# Patient Record
Sex: FEMALE | Race: WHITE | NOT HISPANIC OR LATINO | Employment: OTHER | URBAN - METROPOLITAN AREA
[De-identification: names, ages, dates, MRNs, and addresses within clinical notes are randomized per-mention and may not be internally consistent; named-entity substitution may affect disease eponyms.]

---

## 2023-08-17 ENCOUNTER — HOSPITAL ENCOUNTER (OUTPATIENT)
Dept: DATA CONVERSION | Facility: HOSPITAL | Age: 48
Discharge: HOME | End: 2023-08-17
Payer: COMMERCIAL

## 2023-08-17 DIAGNOSIS — M81.0 AGE-RELATED OSTEOPOROSIS WITHOUT CURRENT PATHOLOGICAL FRACTURE: ICD-10-CM

## 2023-08-28 PROBLEM — D64.9 ANEMIA: Status: ACTIVE | Noted: 2023-08-28

## 2023-08-28 PROBLEM — J44.9 COPD (CHRONIC OBSTRUCTIVE PULMONARY DISEASE) (MULTI): Status: ACTIVE | Noted: 2023-08-28

## 2023-08-28 PROBLEM — F17.200 TOBACCO DEPENDENCE SYNDROME: Status: ACTIVE | Noted: 2023-08-28

## 2023-08-28 PROBLEM — N32.81 OVERACTIVE BLADDER: Status: ACTIVE | Noted: 2023-08-28

## 2023-08-28 PROBLEM — F41.0 PANIC ATTACK: Status: ACTIVE | Noted: 2023-08-28

## 2023-08-28 PROBLEM — K63.89 BACTERIAL OVERGROWTH SYNDROME: Status: ACTIVE | Noted: 2023-08-28

## 2023-08-28 PROBLEM — G63 NEUROPATHY DUE TO MEDICAL CONDITION (MULTI): Status: ACTIVE | Noted: 2023-08-28

## 2023-08-28 PROBLEM — R35.0 URINARY FREQUENCY: Status: ACTIVE | Noted: 2023-08-28

## 2023-08-28 PROBLEM — R31.9 HEMATURIA: Status: ACTIVE | Noted: 2023-08-28

## 2023-08-28 PROBLEM — A63.0 CONDYLOMA ACUMINATUM OF VULVA: Status: ACTIVE | Noted: 2023-08-28

## 2023-08-28 PROBLEM — D72.829 LEUKOCYTOSIS: Status: ACTIVE | Noted: 2023-08-28

## 2023-08-28 PROBLEM — M79.609 PAIN IN LIMB: Status: ACTIVE | Noted: 2023-08-28

## 2023-08-28 PROBLEM — Q67.8: Status: ACTIVE | Noted: 2023-08-28

## 2023-08-28 PROBLEM — R30.0 DYSURIA: Status: ACTIVE | Noted: 2023-08-28

## 2023-08-28 PROBLEM — M81.0 OSTEOPOROSIS: Status: ACTIVE | Noted: 2023-08-28

## 2023-08-28 PROBLEM — M41.9 SCOLIOSIS OF THORACIC SPINE: Status: ACTIVE | Noted: 2023-08-28

## 2023-08-28 PROBLEM — F17.200 SMOKER: Status: ACTIVE | Noted: 2023-08-28

## 2023-08-28 PROBLEM — R61 HYPERHIDROSIS: Status: ACTIVE | Noted: 2023-08-28

## 2023-08-28 PROBLEM — E55.9 VITAMIN D DEFICIENCY: Status: ACTIVE | Noted: 2023-08-28

## 2023-08-28 PROBLEM — N95.1 PERIMENOPAUSAL: Status: ACTIVE | Noted: 2023-08-28

## 2023-08-28 PROBLEM — F41.1 GENERALIZED ANXIETY DISORDER: Status: ACTIVE | Noted: 2023-08-28

## 2023-08-28 PROBLEM — I73.89 ACROCYANOSIS (CMS-HCC): Status: ACTIVE | Noted: 2023-08-28

## 2023-08-28 PROBLEM — R07.9 CHEST PAIN: Status: ACTIVE | Noted: 2023-08-28

## 2023-08-28 PROBLEM — L74.519 PRIMARY FOCAL HYPERHIDROSIS: Status: ACTIVE | Noted: 2023-08-28

## 2023-08-28 PROBLEM — R10.84 ABDOMINAL PAIN, GENERALIZED: Status: ACTIVE | Noted: 2023-08-28

## 2023-08-28 PROBLEM — I10 BENIGN HYPERTENSION: Status: ACTIVE | Noted: 2023-08-28

## 2023-08-28 PROBLEM — N63.0 MASS OF BREAST: Status: ACTIVE | Noted: 2023-08-28

## 2023-08-28 PROBLEM — B00.9 HERPES SIMPLEX: Status: ACTIVE | Noted: 2023-08-28

## 2023-08-28 PROBLEM — J01.90 ACUTE NON-RECURRENT SINUSITIS: Status: ACTIVE | Noted: 2023-08-28

## 2023-08-28 PROBLEM — R53.82 CHRONIC FATIGUE: Status: ACTIVE | Noted: 2023-08-28

## 2023-08-28 PROBLEM — M79.7 FIBROMYALGIA: Status: ACTIVE | Noted: 2023-08-28

## 2023-08-28 PROBLEM — Q50.39 OVARIAN ANOMALY: Status: ACTIVE | Noted: 2023-08-28

## 2023-08-28 PROBLEM — K21.9 GASTROESOPHAGEAL REFLUX DISEASE: Status: ACTIVE | Noted: 2023-08-28

## 2023-08-28 RX ORDER — AMLODIPINE BESYLATE 5 MG/1
5 TABLET ORAL DAILY
COMMUNITY
Start: 2023-01-19 | End: 2024-05-09

## 2023-08-28 RX ORDER — ALPRAZOLAM 0.5 MG/1
0.5 TABLET ORAL NIGHTLY PRN
COMMUNITY

## 2023-08-28 RX ORDER — DULOXETIN HYDROCHLORIDE 60 MG/1
60 CAPSULE, DELAYED RELEASE ORAL 2 TIMES DAILY
COMMUNITY

## 2023-08-28 RX ORDER — PANTOPRAZOLE SODIUM 40 MG/1
40 TABLET, DELAYED RELEASE ORAL
COMMUNITY
Start: 2023-01-19

## 2023-08-28 RX ORDER — TRAZODONE HYDROCHLORIDE 100 MG/1
100 TABLET ORAL NIGHTLY
COMMUNITY

## 2023-08-28 RX ORDER — VALACYCLOVIR HYDROCHLORIDE 500 MG/1
500 TABLET, FILM COATED ORAL DAILY
COMMUNITY
Start: 2023-01-19

## 2023-08-31 RX ORDER — NITROFURANTOIN 25; 75 MG/1; MG/1
100 CAPSULE ORAL EVERY 12 HOURS
COMMUNITY
Start: 2023-04-21 | End: 2023-12-07

## 2023-08-31 RX ORDER — MULTIVITAMIN
TABLET ORAL
COMMUNITY
Start: 2023-08-24

## 2023-08-31 RX ORDER — DULOXETIN HYDROCHLORIDE 30 MG/1
30 CAPSULE, DELAYED RELEASE ORAL EVERY MORNING
COMMUNITY
Start: 2023-03-30 | End: 2023-12-07

## 2023-11-22 ENCOUNTER — APPOINTMENT (OUTPATIENT)
Dept: PRIMARY CARE | Facility: CLINIC | Age: 48
End: 2023-11-22
Payer: COMMERCIAL

## 2023-12-07 ENCOUNTER — OFFICE VISIT (OUTPATIENT)
Dept: PRIMARY CARE | Facility: CLINIC | Age: 48
End: 2023-12-07
Payer: COMMERCIAL

## 2023-12-07 VITALS
OXYGEN SATURATION: 97 % | DIASTOLIC BLOOD PRESSURE: 83 MMHG | HEART RATE: 95 BPM | SYSTOLIC BLOOD PRESSURE: 119 MMHG | BODY MASS INDEX: 19.9 KG/M2 | RESPIRATION RATE: 16 BRPM | HEIGHT: 61 IN | WEIGHT: 105.4 LBS

## 2023-12-07 DIAGNOSIS — M81.0 AGE-RELATED OSTEOPOROSIS WITHOUT CURRENT PATHOLOGICAL FRACTURE: ICD-10-CM

## 2023-12-07 DIAGNOSIS — E55.9 VITAMIN D DEFICIENCY: ICD-10-CM

## 2023-12-07 DIAGNOSIS — M25.551 BILATERAL HIP PAIN: ICD-10-CM

## 2023-12-07 DIAGNOSIS — G56.01 CARPAL TUNNEL SYNDROME OF RIGHT WRIST: ICD-10-CM

## 2023-12-07 DIAGNOSIS — M79.7 FIBROMYALGIA: Primary | ICD-10-CM

## 2023-12-07 DIAGNOSIS — I10 BENIGN HYPERTENSION: ICD-10-CM

## 2023-12-07 DIAGNOSIS — M25.552 BILATERAL HIP PAIN: ICD-10-CM

## 2023-12-07 DIAGNOSIS — Z12.31 SCREENING MAMMOGRAM FOR BREAST CANCER: ICD-10-CM

## 2023-12-07 DIAGNOSIS — F41.1 GENERALIZED ANXIETY DISORDER: ICD-10-CM

## 2023-12-07 PROCEDURE — 4004F PT TOBACCO SCREEN RCVD TLK: CPT

## 2023-12-07 PROCEDURE — 99214 OFFICE O/P EST MOD 30 MIN: CPT

## 2023-12-07 PROCEDURE — 3079F DIAST BP 80-89 MM HG: CPT

## 2023-12-07 PROCEDURE — 3074F SYST BP LT 130 MM HG: CPT

## 2023-12-07 RX ORDER — FOLIC ACID/MULTIVIT,IRON,MINER 0.4MG-18MG
800 TABLET ORAL
Qty: 180 TABLET | Refills: 3 | Status: SHIPPED | OUTPATIENT
Start: 2023-12-07 | End: 2024-12-06

## 2023-12-07 ASSESSMENT — PATIENT HEALTH QUESTIONNAIRE - PHQ9
SUM OF ALL RESPONSES TO PHQ9 QUESTIONS 1 AND 2: 4
SUM OF ALL RESPONSES TO PHQ9 QUESTIONS 1 AND 2: 0
1. LITTLE INTEREST OR PLEASURE IN DOING THINGS: NOT AT ALL
4. FEELING TIRED OR HAVING LITTLE ENERGY: MORE THAN HALF THE DAYS
2. FEELING DOWN, DEPRESSED OR HOPELESS: MORE THAN HALF THE DAYS
9. THOUGHTS THAT YOU WOULD BE BETTER OFF DEAD, OR OF HURTING YOURSELF: NOT AT ALL
SUM OF ALL RESPONSES TO PHQ QUESTIONS 1-9: 14
3. TROUBLE FALLING OR STAYING ASLEEP OR SLEEPING TOO MUCH: MORE THAN HALF THE DAYS
2. FEELING DOWN, DEPRESSED OR HOPELESS: NOT AT ALL
7. TROUBLE CONCENTRATING ON THINGS, SUCH AS READING THE NEWSPAPER OR WATCHING TELEVISION: MORE THAN HALF THE DAYS
1. LITTLE INTEREST OR PLEASURE IN DOING THINGS: MORE THAN HALF THE DAYS
8. MOVING OR SPEAKING SO SLOWLY THAT OTHER PEOPLE COULD HAVE NOTICED. OR THE OPPOSITE, BEING SO FIGETY OR RESTLESS THAT YOU HAVE BEEN MOVING AROUND A LOT MORE THAN USUAL: NOT AT ALL
5. POOR APPETITE OR OVEREATING: MORE THAN HALF THE DAYS
6. FEELING BAD ABOUT YOURSELF - OR THAT YOU ARE A FAILURE OR HAVE LET YOURSELF OR YOUR FAMILY DOWN: MORE THAN HALF THE DAYS

## 2023-12-07 ASSESSMENT — ENCOUNTER SYMPTOMS
LOSS OF SENSATION IN FEET: 0
DEPRESSION: 0
OCCASIONAL FEELINGS OF UNSTEADINESS: 0

## 2023-12-07 ASSESSMENT — COLUMBIA-SUICIDE SEVERITY RATING SCALE - C-SSRS
2. HAVE YOU ACTUALLY HAD ANY THOUGHTS OF KILLING YOURSELF?: NO
1. IN THE PAST MONTH, HAVE YOU WISHED YOU WERE DEAD OR WISHED YOU COULD GO TO SLEEP AND NOT WAKE UP?: NO
6. HAVE YOU EVER DONE ANYTHING, STARTED TO DO ANYTHING, OR PREPARED TO DO ANYTHING TO END YOUR LIFE?: NO

## 2023-12-07 ASSESSMENT — PAIN SCALES - GENERAL: PAINLEVEL: 8

## 2023-12-07 NOTE — PATIENT INSTRUCTIONS
Decrease intake of saturated fats, fast food, sweets.  Increase intake of fresh fruit fresh vegetables and lean meats.  Increase healthy fats seeds, nuts, olive oil instead of butter.  walk 150 minutes/week for heart health.  Decrease intake of processed foods, fast foods, lunch meat, canned soups, canned veggies.  Increase intake of fresh fruits, veggies, and lean meats. Monitor blood pressure at home, keep a log and bring this with you to your next appointment. Call the office if your blood pressure runs 150/90 or higher consistently.          Ways to Help Prevent Falls at Home    Quick Tips   ? Ask for help if you need it. Most people want to help!   ? Get up slowly after sitting or laying down   ? Wear a medical alert device or keep cell phone in your pocket   ? Use night lights, especially areas near a bathroom   ? Keep the items you use often within reach on a small stool or end table   ? Use an assistive device such as walker or cane, as directed by provider/physical therapy   ? Use a non-slip mat and grab bars in your bathroom. Look for home health sections for best options     Other Areas to Focus On   ? Exercise and nutrition: Regular exercise or taking a falls prevention class are great ways improve strength and balance. Don’t forget to stay hydrated and bring a snack!   ? Medicine side effects: Some medicines can make you sleepy or dizzy, which could cause a fall. Ask your healthcare provider about the side effects your medicines could cause. Be sure to let them know if you take any vitamins or supplements as well.   ? Tripping hazards: Remove items you could trip on, such as loose mats, rugs, cords, and clutter. Wear closed toe shoes with rubber soles.   ? Health and wellness: Get regular checkups with your healthcare provider, plus routine vision and hearing screenings. Talk with your healthcare provider about:   o Your medicines and the possible side effects - bring them in a bag if that is easier!    o Problems with balance or feeling dizzy   o Ways to promote bone health, such as Vitamin D and calcium supplements   o Questions or concerns about falling     *Ask your healthcare team if you have questions     [facility], 2022         Ways to Help Prevent Falls at Home    Quick Tips   ? Ask for help if you need it. Most people want to help!   ? Get up slowly after sitting or laying down   ? Wear a medical alert device or keep cell phone in your pocket   ? Use night lights, especially areas near a bathroom   ? Keep the items you use often within reach on a small stool or end table   ? Use an assistive device such as walker or cane, as directed by provider/physical therapy   ? Use a non-slip mat and grab bars in your bathroom. Look for home health sections for best options     Other Areas to Focus On   ? Exercise and nutrition: Regular exercise or taking a falls prevention class are great ways improve strength and balance. Don’t forget to stay hydrated and bring a snack!   ? Medicine side effects: Some medicines can make you sleepy or dizzy, which could cause a fall. Ask your healthcare provider about the side effects your medicines could cause. Be sure to let them know if you take any vitamins or supplements as well.   ? Tripping hazards: Remove items you could trip on, such as loose mats, rugs, cords, and clutter. Wear closed toe shoes with rubber soles.   ? Health and wellness: Get regular checkups with your healthcare provider, plus routine vision and hearing screenings. Talk with your healthcare provider about:   o Your medicines and the possible side effects - bring them in a bag if that is easier!   o Problems with balance or feeling dizzy   o Ways to promote bone health, such as Vitamin D and calcium supplements   o Questions or concerns about falling     *Ask your healthcare team if you have questions     ©[facility], 2022

## 2023-12-07 NOTE — PROGRESS NOTES
"Subjective   Patient ID: Julee Hinojosa is a 48 y.o. female who presents for Follow-up.    HPI   Follow-up.  Since I saw her last she has been having right numbing painful sensation in her right wrists.  She denies any issues with chest pain, chest pressure, shortness of breath constipation, diarrhea, blood in stool, urinary urgency, frequency, blood in urine muscle weakness in arms and legs numbness and tingling in her feet.  She does have a history of fibromyalgia that she does not see rheumatology for.  She does not monitor her blood pressures at home.  She does note that she has fallen with no injury.  She has bilateral hip pain only with sleep sharp shooting pains will get x-ray today    Review of Systems  Review of Systems negative except as noted in HPI and Chief complaint.    Current Outpatient Medications:     ALPRAZolam (Xanax) 0.5 mg tablet, Take 1 tablet (0.5 mg) by mouth as needed at bedtime., Disp: , Rfl:     amLODIPine (Norvasc) 5 mg tablet, Take 1 tablet (5 mg) by mouth once daily., Disp: , Rfl:     calcium carbonate-vitamin D3 600 mg-10 mcg (400 unit) tablet, , Disp: , Rfl:     DULoxetine (Cymbalta) 60 mg DR capsule, Take 1 capsule (60 mg) by mouth 2 times a day., Disp: , Rfl:     pantoprazole (ProtoNix) 40 mg EC tablet, Take 1 tablet (40 mg) by mouth once daily in the morning. Take before meals., Disp: , Rfl:     traZODone (Desyrel) 100 mg tablet, Take 1 tablet (100 mg) by mouth once daily at bedtime., Disp: , Rfl:     valACYclovir (Valtrex) 500 mg tablet, Take 1 tablet (500 mg) by mouth once daily., Disp: , Rfl: '    Objective   /83 (BP Location: Left arm, Patient Position: Sitting, BP Cuff Size: Adult)   Pulse 95   Resp 16   Ht 1.549 m (5' 1\")   Wt 47.8 kg (105 lb 6.4 oz)   SpO2 97%   BMI 19.92 kg/m²     Physical Exam  Vitals and nursing note reviewed.   Constitutional:       Appearance: Normal appearance.   Cardiovascular:      Rate and Rhythm: Normal rate and regular rhythm. "   Musculoskeletal:         General: Normal range of motion.      Cervical back: Normal range of motion.   Neurological:      General: No focal deficit present.      Mental Status: She is alert and oriented to person, place, and time. Mental status is at baseline.   Psychiatric:         Mood and Affect: Mood normal.         Behavior: Behavior normal.         Thought Content: Thought content normal.         Judgment: Judgment normal.     Assessment/Plan   Diagnoses and all orders for this visit:  Fibromyalgia  Generalized anxiety disorder  Benign hypertension  Vitamin D deficiency  Age-related osteoporosis without current pathological fracture  Other orders  -     Follow Up In Primary Care - Health Maintenance; Future    Decrease intake of saturated fats, fast food, sweets.  Increase intake of fresh fruit fresh vegetables and lean meats.  Increase healthy fats seeds, nuts, olive oil instead of butter.  walk 150 minutes/week for heart health.  Decrease intake of processed foods, fast foods, lunch meat, canned soups, canned veggies.  Increase intake of fresh fruits, veggies, and lean meats. Monitor blood pressure at home, keep a log and bring this with you to your next appointment. Call the office if your blood pressure runs 150/90 or higher consistently.  This note was dictated using DRAGON speech recognition software and was corrected for spelling or grammatical errors, but despite proofreading several typographical errors might be present that might affect the meaning of the content.  Perri Silver CNP    Patient was identified as a fall risk. Risk prevention instructions provided.Patient was identified as a fall risk. Risk prevention instructions provided.

## 2024-03-26 ENCOUNTER — APPOINTMENT (OUTPATIENT)
Dept: PRIMARY CARE | Facility: CLINIC | Age: 49
End: 2024-03-26
Payer: COMMERCIAL

## 2024-03-27 ENCOUNTER — APPOINTMENT (OUTPATIENT)
Dept: PRIMARY CARE | Facility: CLINIC | Age: 49
End: 2024-03-27
Payer: COMMERCIAL

## 2024-04-18 ENCOUNTER — OFFICE VISIT (OUTPATIENT)
Dept: PRIMARY CARE | Facility: CLINIC | Age: 49
End: 2024-04-18
Payer: COMMERCIAL

## 2024-04-18 VITALS
DIASTOLIC BLOOD PRESSURE: 84 MMHG | RESPIRATION RATE: 16 BRPM | WEIGHT: 104.8 LBS | BODY MASS INDEX: 19.79 KG/M2 | HEIGHT: 61 IN | HEART RATE: 83 BPM | SYSTOLIC BLOOD PRESSURE: 123 MMHG | OXYGEN SATURATION: 99 %

## 2024-04-18 DIAGNOSIS — Z13.6 ENCOUNTER FOR LIPID SCREENING FOR CARDIOVASCULAR DISEASE: ICD-10-CM

## 2024-04-18 DIAGNOSIS — Z13.220 ENCOUNTER FOR LIPID SCREENING FOR CARDIOVASCULAR DISEASE: ICD-10-CM

## 2024-04-18 DIAGNOSIS — R53.82 CHRONIC FATIGUE: ICD-10-CM

## 2024-04-18 DIAGNOSIS — R61 HYPERHIDROSIS: ICD-10-CM

## 2024-04-18 DIAGNOSIS — I10 BENIGN HYPERTENSION: Primary | ICD-10-CM

## 2024-04-18 DIAGNOSIS — Z72.0 TOBACCO USE: ICD-10-CM

## 2024-04-18 DIAGNOSIS — F41.0 PANIC ATTACK: ICD-10-CM

## 2024-04-18 DIAGNOSIS — K51.80 OTHER ULCERATIVE COLITIS WITHOUT COMPLICATION (MULTI): ICD-10-CM

## 2024-04-18 DIAGNOSIS — R19.7 DIARRHEA OF PRESUMED INFECTIOUS ORIGIN: ICD-10-CM

## 2024-04-18 DIAGNOSIS — D22.9 CHANGE IN MOLE: ICD-10-CM

## 2024-04-18 DIAGNOSIS — E55.9 VITAMIN D DEFICIENCY: ICD-10-CM

## 2024-04-18 DIAGNOSIS — Z13.29 SCREENING FOR THYROID DISORDER: ICD-10-CM

## 2024-04-18 DIAGNOSIS — M81.0 AGE-RELATED OSTEOPOROSIS WITHOUT CURRENT PATHOLOGICAL FRACTURE: ICD-10-CM

## 2024-04-18 DIAGNOSIS — J41.0 SIMPLE CHRONIC BRONCHITIS (MULTI): ICD-10-CM

## 2024-04-18 DIAGNOSIS — G63 NEUROPATHY DUE TO MEDICAL CONDITION (MULTI): ICD-10-CM

## 2024-04-18 DIAGNOSIS — R73.01 ELEVATED FASTING GLUCOSE: ICD-10-CM

## 2024-04-18 DIAGNOSIS — K21.9 GASTROESOPHAGEAL REFLUX DISEASE WITHOUT ESOPHAGITIS: ICD-10-CM

## 2024-04-18 DIAGNOSIS — F17.200 TOBACCO DEPENDENCE SYNDROME: ICD-10-CM

## 2024-04-18 DIAGNOSIS — M41.9 SCOLIOSIS OF THORACIC SPINE, UNSPECIFIED SCOLIOSIS TYPE: ICD-10-CM

## 2024-04-18 DIAGNOSIS — Z91.89 AT RISK FOR DEPRESSION: ICD-10-CM

## 2024-04-18 DIAGNOSIS — Z00.00 ENCOUNTER FOR ANNUAL PHYSICAL EXAM: ICD-10-CM

## 2024-04-18 DIAGNOSIS — I73.89 ACROCYANOSIS (CMS-HCC): ICD-10-CM

## 2024-04-18 DIAGNOSIS — F41.1 GENERALIZED ANXIETY DISORDER: ICD-10-CM

## 2024-04-18 PROBLEM — K51.90 ULCERATIVE COLITIS (MULTI): Status: ACTIVE | Noted: 2024-04-18

## 2024-04-18 PROCEDURE — 99396 PREV VISIT EST AGE 40-64: CPT

## 2024-04-18 PROCEDURE — 93010 ELECTROCARDIOGRAM REPORT: CPT

## 2024-04-18 PROCEDURE — 93005 ELECTROCARDIOGRAM TRACING: CPT

## 2024-04-18 PROCEDURE — 3079F DIAST BP 80-89 MM HG: CPT

## 2024-04-18 PROCEDURE — 4004F PT TOBACCO SCREEN RCVD TLK: CPT

## 2024-04-18 PROCEDURE — 3074F SYST BP LT 130 MM HG: CPT

## 2024-04-18 RX ORDER — ONDANSETRON 4 MG/1
4 TABLET, FILM COATED ORAL EVERY 8 HOURS PRN
COMMUNITY

## 2024-04-18 ASSESSMENT — PATIENT HEALTH QUESTIONNAIRE - PHQ9
4. FEELING TIRED OR HAVING LITTLE ENERGY: MORE THAN HALF THE DAYS
1. LITTLE INTEREST OR PLEASURE IN DOING THINGS: MORE THAN HALF THE DAYS
SUM OF ALL RESPONSES TO PHQ9 QUESTIONS 1 AND 2: 4
6. FEELING BAD ABOUT YOURSELF - OR THAT YOU ARE A FAILURE OR HAVE LET YOURSELF OR YOUR FAMILY DOWN: MORE THAN HALF THE DAYS
1. LITTLE INTEREST OR PLEASURE IN DOING THINGS: NOT AT ALL
3. TROUBLE FALLING OR STAYING ASLEEP OR SLEEPING TOO MUCH: MORE THAN HALF THE DAYS
2. FEELING DOWN, DEPRESSED OR HOPELESS: MORE THAN HALF THE DAYS
7. TROUBLE CONCENTRATING ON THINGS, SUCH AS READING THE NEWSPAPER OR WATCHING TELEVISION: MORE THAN HALF THE DAYS
8. MOVING OR SPEAKING SO SLOWLY THAT OTHER PEOPLE COULD HAVE NOTICED. OR THE OPPOSITE, BEING SO FIGETY OR RESTLESS THAT YOU HAVE BEEN MOVING AROUND A LOT MORE THAN USUAL: MORE THAN HALF THE DAYS
SUM OF ALL RESPONSES TO PHQ QUESTIONS 1-9: 16
2. FEELING DOWN, DEPRESSED OR HOPELESS: NOT AT ALL
10. IF YOU CHECKED OFF ANY PROBLEMS, HOW DIFFICULT HAVE THESE PROBLEMS MADE IT FOR YOU TO DO YOUR WORK, TAKE CARE OF THINGS AT HOME, OR GET ALONG WITH OTHER PEOPLE: SOMEWHAT DIFFICULT
5. POOR APPETITE OR OVEREATING: MORE THAN HALF THE DAYS
SUM OF ALL RESPONSES TO PHQ9 QUESTIONS 1 AND 2: 0
9. THOUGHTS THAT YOU WOULD BE BETTER OFF DEAD, OR OF HURTING YOURSELF: NOT AT ALL

## 2024-04-18 ASSESSMENT — ENCOUNTER SYMPTOMS
LOSS OF SENSATION IN FEET: 0
DEPRESSION: 0
OCCASIONAL FEELINGS OF UNSTEADINESS: 0

## 2024-04-18 ASSESSMENT — COLUMBIA-SUICIDE SEVERITY RATING SCALE - C-SSRS
1. IN THE PAST MONTH, HAVE YOU WISHED YOU WERE DEAD OR WISHED YOU COULD GO TO SLEEP AND NOT WAKE UP?: NO
2. HAVE YOU ACTUALLY HAD ANY THOUGHTS OF KILLING YOURSELF?: NO
6. HAVE YOU EVER DONE ANYTHING, STARTED TO DO ANYTHING, OR PREPARED TO DO ANYTHING TO END YOUR LIFE?: NO

## 2024-04-18 ASSESSMENT — PAIN SCALES - GENERAL: PAINLEVEL: 8

## 2024-04-18 NOTE — PROGRESS NOTES
Subjective   Patient ID: Julee iHnojosa is a 49 y.o. female who presents for Annual Exam.    HPI   Patient denies any falls, urgent care, ER, hospitalization, new diagnoses, surgeries in the past year.  Denies any issues with chest pain, chest pressure, shortness of breath, constipation, diarrhea, blood in stool, urinary urgency, frequency, blood in urine, muscle weakness in arms and legs, numbness or tingling in fingers or toes.  Patient has multiple moles on her back that she would like to have looked at.  She is 13 cavities that need to be filled.  She went to the dentist they gave her 4 shots of Novocain after that they returned to the office to tell her they could not pull her teeth because she has a history of osteoporosis.  She would like a referral back to rheumatology.  She has ongoing issues with hyperhidrosis under arms, hands, feet she has tried multiple different medications in the past unclear at this time what those medications were she would like referral back to dermatology to discuss ongoing treatment.  Since she was given a shot of Novocain from her dentist she was on 3 different antibiotics since then she has had diarrhea she has been feeling dizzy her ears have been ringing and she feels warm and tingly all over when she stands up.  She does have a history of colitis per the patient has not followed up with GI    Review of Systems  Review of Systems negative except as noted in HPI and Chief complaint.    Current Outpatient Medications:     ALPRAZolam (Xanax) 0.5 mg tablet, Take 1 tablet (0.5 mg) by mouth as needed at bedtime., Disp: , Rfl:     amLODIPine (Norvasc) 5 mg tablet, Take 1 tablet (5 mg) by mouth once daily., Disp: , Rfl:     calcium carbonate-vitamin D3 600 mg-10 mcg (400 unit) tablet, , Disp: , Rfl:     cholecalciferol (Vitamin D-3) 10 mcg (400 unit) tablet,chewable, Chew 2 tablets (800 Units) once daily., Disp: 180 tablet, Rfl: 3    DULoxetine (Cymbalta) 60 mg DR capsule, Take 1  "capsule (60 mg) by mouth 2 times a day., Disp: , Rfl:     ondansetron (Zofran) 4 mg tablet, Take 1 tablet (4 mg) by mouth every 8 hours if needed for nausea or vomiting., Disp: , Rfl:     pantoprazole (ProtoNix) 40 mg EC tablet, Take 1 tablet (40 mg) by mouth once daily in the morning. Take before meals., Disp: , Rfl:     traZODone (Desyrel) 100 mg tablet, Take 1 tablet (100 mg) by mouth once daily at bedtime., Disp: , Rfl:     valACYclovir (Valtrex) 500 mg tablet, Take 1 tablet (500 mg) by mouth once daily., Disp: , Rfl:     Objective   /84 (BP Location: Left arm, Patient Position: Sitting, BP Cuff Size: Adult)   Pulse 83   Resp 16   Ht 1.549 m (5' 1\")   Wt 47.5 kg (104 lb 12.8 oz)   SpO2 99%   BMI 19.80 kg/m²     Physical Exam  Vitals reviewed.   Constitutional:       General: She is not in acute distress.     Appearance: Normal appearance.   HENT:      Head: Normocephalic.      Right Ear: Tympanic membrane normal.      Left Ear: Tympanic membrane normal.      Nose: Nose normal.      Mouth/Throat:      Mouth: Mucous membranes are moist.      Pharynx: Oropharynx is clear.   Eyes:      Extraocular Movements: Extraocular movements intact.      Conjunctiva/sclera: Conjunctivae normal.      Pupils: Pupils are equal, round, and reactive to light.   Cardiovascular:      Rate and Rhythm: Normal rate.      Pulses: Normal pulses.      Heart sounds: Normal heart sounds.   Pulmonary:      Effort: Pulmonary effort is normal.      Breath sounds: Normal breath sounds.   Abdominal:      General: Abdomen is flat. Bowel sounds are normal.      Palpations: Abdomen is soft.   Musculoskeletal:         General: Normal range of motion.   Skin:     General: Skin is warm and dry.      Capillary Refill: Capillary refill takes 2 to 3 seconds.   Neurological:      General: No focal deficit present.      Mental Status: She is alert and oriented to person, place, and time. Mental status is at baseline.   Psychiatric:         Mood " and Affect: Mood normal.         Behavior: Behavior is cooperative.         Assessment/Plan   Diagnoses and all orders for this visit:  Benign hypertension  -     ECG 12 lead (Clinic Performed)  -     Comprehensive Metabolic Panel; Future  -     CBC and Auto Differential; Future  Neuropathy due to medical condition (Multi)  Simple chronic bronchitis (Multi)  Acrocyanosis (CMS-HCC)  Vitamin D deficiency  -     Vitamin D 25-Hydroxy,Total (for eval of Vitamin D levels); Future  Age-related osteoporosis without current pathological fracture  -     Referral to Rheumatology; Future  Gastroesophageal reflux disease without esophagitis  Panic attack  Generalized anxiety disorder  Scoliosis of thoracic spine, unspecified scoliosis type  Tobacco dependence syndrome  Chronic fatigue  Hyperhidrosis  -     Referral to Dermatology  Tobacco use  Encounter for annual physical exam  Encounter for lipid screening for cardiovascular disease  -     Lipid Panel; Future  Screening for thyroid disorder  -     TSH with reflex to Free T4 if abnormal; Future  Elevated fasting glucose  -     Hemoglobin A1C; Future  At risk for depression  Change in mole  -     Referral to Dermatology  Diarrhea of presumed infectious origin  -     Giardia/Cryptosporidium Antigens; Future  -     Occult blood x 1, stool; Future  -     CT abdomen w and wo IV contrast; Future  -     Ambulatory referral to Gastroenterology; Future    ANXIETY:   Stress/Anxiety reduction interventions:    -Relaxation techniques- deep breathing, meditation, acupuncture, guided imagery, yoga  -3-3-3 sit down in a quiet room. Look around the room and name 3 items you see. Then close your eyes and listen and quietly name 3 items you hear around you. Lastly, move 3 different body parts in a [place] 10 times each  -Avoid caffeine, alcohol, illicit drug use  -Get enough sleep, 7-9 hours per night  -Eat a healthy diet, prioritizing lean proteins, fruits/vegetables, and whole  grains  -Purposeful movement daily- walking, biking, strength training  -Speaking with a counselor can be very helpful.  LAB Order/ BLOOD TESTS   I have ordered lab work for you to get done. This should be fasting. Nothing to eat or drink after midnight besides black tea,  black coffee, or water. If you do not hear from this office within two days of having your labs done, please call for your results.     HYPERLIPIDEMIA:  Decrease intake of saturated fats, fast food, sweets.  Increase intake of fresh fruit fresh vegetables and lean meats.  Increase healthy fats seeds, nuts, olive oil instead of butter.  walk 150 minutes/week for heart health.    Colitis, diarrhea-    I have placed a referral to GI for further management.  Moles/Hyperhidrosis-  I have placed a referral to derm for further management.    *This note was dictated using DRAGON speech recognition software and was corrected for spelling or grammatical errors, but despite proofreading several typographical errors might be present that might affect the meaning of the content.*  Perri Silver CNP    Time Spent  Prep time on day of patient encounter: 5 minutes  Time spent directly with patient, family or caregiver: 20 minutes  Documentation Time: 5 minutes    Patient was identified as a fall risk. Risk prevention instructions provided.

## 2024-04-18 NOTE — PATIENT INSTRUCTIONS
ANXIETY:   Stress/Anxiety reduction interventions:    -Relaxation techniques- deep breathing, meditation, acupuncture, guided imagery, yoga  -3-3-3 sit down in a quiet room. Look around the room and name 3 items you see. Then close your eyes and listen and quietly name 3 items you hear around you. Lastly, move 3 different body parts in a [place] 10 times each  -Avoid caffeine, alcohol, illicit drug use  -Get enough sleep, 7-9 hours per night  -Eat a healthy diet, prioritizing lean proteins, fruits/vegetables, and whole grains  -Purposeful movement daily- walking, biking, strength training  -Speaking with a counselor can be very helpful.  LAB Order/ BLOOD TESTS   I have ordered lab work for you to get done. This should be fasting. Nothing to eat or drink after midnight besides black tea,  black coffee, or water. If you do not hear from this office within two days of having your labs done, please call for your results.     HYPERLIPIDEMIA:  Decrease intake of saturated fats, fast food, sweets.  Increase intake of fresh fruit fresh vegetables and lean meats.  Increase healthy fats seeds, nuts, olive oil instead of butter.  walk 150 minutes/week for heart health.    Colitis, diarrhea-    I have placed a referral to GI for further management.  Moles/Hyperhidrosis-  I have placed a referral to derm for further management.  Ways to Help Prevent Falls at Home    Quick Tips   ? Ask for help if you need it. Most people want to help!   ? Get up slowly after sitting or laying down   ? Wear a medical alert device or keep cell phone in your pocket   ? Use night lights, especially areas near a bathroom   ? Keep the items you use often within reach on a small stool or end table   ? Use an assistive device such as walker or cane, as directed by provider/physical therapy   ? Use a non-slip mat and grab bars in your bathroom. Look for home health sections for best options     Other Areas to Focus On   ? Exercise and nutrition: Regular  exercise or taking a falls prevention class are great ways improve strength and balance. Don’t forget to stay hydrated and bring a snack!   ? Medicine side effects: Some medicines can make you sleepy or dizzy, which could cause a fall. Ask your healthcare provider about the side effects your medicines could cause. Be sure to let them know if you take any vitamins or supplements as well.   ? Tripping hazards: Remove items you could trip on, such as loose mats, rugs, cords, and clutter. Wear closed toe shoes with rubber soles.   ? Health and wellness: Get regular checkups with your healthcare provider, plus routine vision and hearing screenings. Talk with your healthcare provider about:   o Your medicines and the possible side effects - bring them in a bag if that is easier!   o Problems with balance or feeling dizzy   o Ways to promote bone health, such as Vitamin D and calcium supplements   o Questions or concerns about falling     *Ask your healthcare team if you have questions     ©[facility], 2022    pt OOB to chair already

## 2024-04-19 ENCOUNTER — LAB (OUTPATIENT)
Dept: LAB | Facility: LAB | Age: 49
End: 2024-04-19
Payer: COMMERCIAL

## 2024-04-19 DIAGNOSIS — I10 BENIGN HYPERTENSION: ICD-10-CM

## 2024-04-19 DIAGNOSIS — R19.7 DIARRHEA OF PRESUMED INFECTIOUS ORIGIN: ICD-10-CM

## 2024-04-19 DIAGNOSIS — Z13.6 ENCOUNTER FOR LIPID SCREENING FOR CARDIOVASCULAR DISEASE: ICD-10-CM

## 2024-04-19 DIAGNOSIS — E55.9 VITAMIN D DEFICIENCY: ICD-10-CM

## 2024-04-19 DIAGNOSIS — Z13.220 ENCOUNTER FOR LIPID SCREENING FOR CARDIOVASCULAR DISEASE: ICD-10-CM

## 2024-04-19 DIAGNOSIS — R73.01 ELEVATED FASTING GLUCOSE: ICD-10-CM

## 2024-04-19 DIAGNOSIS — Z13.29 SCREENING FOR THYROID DISORDER: ICD-10-CM

## 2024-04-19 LAB
25(OH)D3 SERPL-MCNC: 61 NG/ML (ref 31–100)
ALBUMIN SERPL-MCNC: 4.2 G/DL (ref 3.5–5)
ALP BLD-CCNC: 87 U/L (ref 35–125)
ALT SERPL-CCNC: 11 U/L (ref 5–40)
ANION GAP SERPL CALC-SCNC: 11 MMOL/L
AST SERPL-CCNC: 18 U/L (ref 5–40)
BASOPHILS # BLD AUTO: 0.03 X10*3/UL (ref 0–0.1)
BASOPHILS NFR BLD AUTO: 0.4 %
BILIRUB SERPL-MCNC: 0.3 MG/DL (ref 0.1–1.2)
BUN SERPL-MCNC: 12 MG/DL (ref 8–25)
CALCIUM SERPL-MCNC: 9.6 MG/DL (ref 8.5–10.4)
CHLORIDE SERPL-SCNC: 102 MMOL/L (ref 97–107)
CHOLEST SERPL-MCNC: 189 MG/DL (ref 133–200)
CHOLEST/HDLC SERPL: 2.2 {RATIO}
CO2 SERPL-SCNC: 26 MMOL/L (ref 24–31)
CREAT SERPL-MCNC: 0.6 MG/DL (ref 0.4–1.6)
EGFRCR SERPLBLD CKD-EPI 2021: >90 ML/MIN/1.73M*2
EOSINOPHIL # BLD AUTO: 0.2 X10*3/UL (ref 0–0.7)
EOSINOPHIL NFR BLD AUTO: 2.6 %
ERYTHROCYTE [DISTWIDTH] IN BLOOD BY AUTOMATED COUNT: 13.2 % (ref 11.5–14.5)
EST. AVERAGE GLUCOSE BLD GHB EST-MCNC: 97 MG/DL
GLUCOSE SERPL-MCNC: 114 MG/DL (ref 65–99)
HBA1C MFR BLD: 5 %
HCT VFR BLD AUTO: 42.7 % (ref 36–46)
HDLC SERPL-MCNC: 85 MG/DL
HEMOCCULT SP1 STL QL: NEGATIVE
HGB BLD-MCNC: 14.2 G/DL (ref 12–16)
IMM GRANULOCYTES # BLD AUTO: 0.03 X10*3/UL (ref 0–0.7)
IMM GRANULOCYTES NFR BLD AUTO: 0.4 % (ref 0–0.9)
LDLC SERPL CALC-MCNC: 75 MG/DL (ref 65–130)
LYMPHOCYTES # BLD AUTO: 1.84 X10*3/UL (ref 1.2–4.8)
LYMPHOCYTES NFR BLD AUTO: 23.5 %
MCH RBC QN AUTO: 33.6 PG (ref 26–34)
MCHC RBC AUTO-ENTMCNC: 33.3 G/DL (ref 32–36)
MCV RBC AUTO: 101 FL (ref 80–100)
MONOCYTES # BLD AUTO: 0.7 X10*3/UL (ref 0.1–1)
MONOCYTES NFR BLD AUTO: 9 %
NEUTROPHILS # BLD AUTO: 5.02 X10*3/UL (ref 1.2–7.7)
NEUTROPHILS NFR BLD AUTO: 64.1 %
NRBC BLD-RTO: 0 /100 WBCS (ref 0–0)
PLATELET # BLD AUTO: 398 X10*3/UL (ref 150–450)
POTASSIUM SERPL-SCNC: 4.8 MMOL/L (ref 3.4–5.1)
PROT SERPL-MCNC: 6.5 G/DL (ref 5.9–7.9)
RBC # BLD AUTO: 4.22 X10*6/UL (ref 4–5.2)
SODIUM SERPL-SCNC: 139 MMOL/L (ref 133–145)
TRIGL SERPL-MCNC: 147 MG/DL (ref 40–150)
TSH SERPL DL<=0.05 MIU/L-ACNC: 0.56 MIU/L (ref 0.27–4.2)
WBC # BLD AUTO: 7.8 X10*3/UL (ref 4.4–11.3)

## 2024-04-19 PROCEDURE — 82270 OCCULT BLOOD FECES: CPT

## 2024-04-19 PROCEDURE — 87328 CRYPTOSPORIDIUM AG IA: CPT

## 2024-04-19 PROCEDURE — 87329 GIARDIA AG IA: CPT

## 2024-04-19 PROCEDURE — 80053 COMPREHEN METABOLIC PANEL: CPT

## 2024-04-19 PROCEDURE — 85025 COMPLETE CBC W/AUTO DIFF WBC: CPT

## 2024-04-19 PROCEDURE — 36415 COLL VENOUS BLD VENIPUNCTURE: CPT

## 2024-04-19 PROCEDURE — 80061 LIPID PANEL: CPT

## 2024-04-19 PROCEDURE — 82306 VITAMIN D 25 HYDROXY: CPT

## 2024-04-19 PROCEDURE — 83036 HEMOGLOBIN GLYCOSYLATED A1C: CPT

## 2024-04-19 PROCEDURE — 84443 ASSAY THYROID STIM HORMONE: CPT

## 2024-04-23 LAB
CRYPTOSP AG STL QL IA: NEGATIVE
G LAMBLIA AG STL QL IA: NEGATIVE

## 2024-05-06 ENCOUNTER — APPOINTMENT (OUTPATIENT)
Dept: RADIOLOGY | Facility: HOSPITAL | Age: 49
End: 2024-05-06
Payer: COMMERCIAL

## 2024-05-09 ENCOUNTER — OFFICE VISIT (OUTPATIENT)
Dept: PRIMARY CARE | Facility: CLINIC | Age: 49
End: 2024-05-09
Payer: COMMERCIAL

## 2024-05-09 VITALS
WEIGHT: 103.4 LBS | HEIGHT: 61 IN | SYSTOLIC BLOOD PRESSURE: 125 MMHG | HEART RATE: 87 BPM | OXYGEN SATURATION: 98 % | DIASTOLIC BLOOD PRESSURE: 76 MMHG | BODY MASS INDEX: 19.52 KG/M2 | RESPIRATION RATE: 16 BRPM

## 2024-05-09 DIAGNOSIS — Z72.0 TOBACCO USE: Primary | ICD-10-CM

## 2024-05-09 DIAGNOSIS — R42 VERTIGO: ICD-10-CM

## 2024-05-09 DIAGNOSIS — I10 BENIGN HYPERTENSION: ICD-10-CM

## 2024-05-09 PROCEDURE — 3078F DIAST BP <80 MM HG: CPT

## 2024-05-09 PROCEDURE — 4004F PT TOBACCO SCREEN RCVD TLK: CPT

## 2024-05-09 PROCEDURE — 99214 OFFICE O/P EST MOD 30 MIN: CPT

## 2024-05-09 PROCEDURE — 3074F SYST BP LT 130 MM HG: CPT

## 2024-05-09 RX ORDER — AMLODIPINE BESYLATE 10 MG/1
10 TABLET ORAL DAILY
Qty: 30 TABLET | Refills: 5 | Status: SHIPPED | OUTPATIENT
Start: 2024-05-09 | End: 2024-11-05

## 2024-05-09 RX ORDER — MECLIZINE HCL 12.5 MG 12.5 MG/1
12.5 TABLET ORAL 3 TIMES DAILY PRN
Qty: 30 TABLET | Refills: 11 | Status: SHIPPED | OUTPATIENT
Start: 2024-05-09 | End: 2025-05-09

## 2024-05-09 ASSESSMENT — ENCOUNTER SYMPTOMS
LOSS OF SENSATION IN FEET: 0
OCCASIONAL FEELINGS OF UNSTEADINESS: 0
DEPRESSION: 0

## 2024-05-09 ASSESSMENT — COLUMBIA-SUICIDE SEVERITY RATING SCALE - C-SSRS
6. HAVE YOU EVER DONE ANYTHING, STARTED TO DO ANYTHING, OR PREPARED TO DO ANYTHING TO END YOUR LIFE?: NO
2. HAVE YOU ACTUALLY HAD ANY THOUGHTS OF KILLING YOURSELF?: NO
1. IN THE PAST MONTH, HAVE YOU WISHED YOU WERE DEAD OR WISHED YOU COULD GO TO SLEEP AND NOT WAKE UP?: NO

## 2024-05-09 ASSESSMENT — PAIN SCALES - GENERAL: PAINLEVEL: 8

## 2024-05-09 ASSESSMENT — PATIENT HEALTH QUESTIONNAIRE - PHQ9
2. FEELING DOWN, DEPRESSED OR HOPELESS: NOT AT ALL
1. LITTLE INTEREST OR PLEASURE IN DOING THINGS: NOT AT ALL
SUM OF ALL RESPONSES TO PHQ9 QUESTIONS 1 AND 2: 0

## 2024-05-09 NOTE — PROGRESS NOTES
Subjective   Patient ID: Julee Hinojosa is a 49 y.o. female who presents for Vertigo.    HPI   Patient has noted for the past week and a half she has had severe head pressure, dizziness and vertigo symptoms.  She did bring her blood pressure cuff with her today and memory show blood pressures running 120s to 140s over 70s to 105.  She also mentions that her psychiatrist would like to add an medication for her for PTSD but it has a potential to decrease her blood pressure.  She wanted to discuss it with me first it is not clear what the medication is.  Review of Systems  Review of Systems negative except as noted in HPI and Chief complaint.    Current Outpatient Medications:     ALPRAZolam (Xanax) 0.5 mg tablet, Take 1 tablet (0.5 mg) by mouth as needed at bedtime., Disp: , Rfl:     calcium carbonate-vitamin D3 600 mg-10 mcg (400 unit) tablet, , Disp: , Rfl:     cholecalciferol (Vitamin D-3) 10 mcg (400 unit) tablet,chewable, Chew 2 tablets (800 Units) once daily., Disp: 180 tablet, Rfl: 3    DULoxetine (Cymbalta) 60 mg DR capsule, Take 1 capsule (60 mg) by mouth 2 times a day., Disp: , Rfl:     ondansetron (Zofran) 4 mg tablet, Take 1 tablet (4 mg) by mouth every 8 hours if needed for nausea or vomiting., Disp: , Rfl:     pantoprazole (ProtoNix) 40 mg EC tablet, Take 1 tablet (40 mg) by mouth once daily in the morning. Take before meals., Disp: , Rfl:     traZODone (Desyrel) 100 mg tablet, Take 1 tablet (100 mg) by mouth once daily at bedtime., Disp: , Rfl:     valACYclovir (Valtrex) 500 mg tablet, Take 1 tablet (500 mg) by mouth once daily., Disp: , Rfl:     amLODIPine (Norvasc) 10 mg tablet, Take 1 tablet (10 mg) by mouth once daily., Disp: 30 tablet, Rfl: 5    meclizine (Antivert) 12.5 mg tablet, Take 1 tablet (12.5 mg) by mouth 3 times a day as needed for dizziness., Disp: 30 tablet, Rfl: 11    Objective   /76 (BP Location: Left arm, Patient Position: Sitting, BP Cuff Size: Adult)   Pulse 87   Resp  "16   Ht 1.549 m (5' 1\")   Wt 46.9 kg (103 lb 6.4 oz)   SpO2 98%   BMI 19.54 kg/m²     Physical Exam  Vitals reviewed.   Cardiovascular:      Rate and Rhythm: Normal rate.   Pulmonary:      Effort: Pulmonary effort is normal.   Musculoskeletal:         General: Normal range of motion.      Cervical back: Normal range of motion.   Neurological:      General: No focal deficit present.      Mental Status: She is alert.   Psychiatric:         Mood and Affect: Mood normal.       Assessment/Plan   Diagnoses and all orders for this visit:  Tobacco use  Vertigo  -     meclizine (Antivert) 12.5 mg tablet; Take 1 tablet (12.5 mg) by mouth 3 times a day as needed for dizziness.  Benign hypertension  -     amLODIPine (Norvasc) 10 mg tablet; Take 1 tablet (10 mg) by mouth once daily.  HYPERTENSION:   Decrease intake of processed foods, fast foods, lunch meat, canned soups, canned veggies.  Increase intake of fresh fruits, veggies, and lean meats. Monitor blood pressure at home, keep a log and bring this with you to your next appointment. Call the office if your blood pressure runs 150/90 or higher consistently.  Blood Pressure Technique:  Sit quietly in a chair for 5 minutes with back supported and feet on the floor  Then place left arm on a table or armrest so bicep area is at the same level of heart or left breast  Check three times in a row, disregard the highest reading and average the other two    Vertigo:    Move slowly when standing up, turning your head or performing other triggering movements.  Sleep with your head elevated on two pillows.  Lie in a dark, quiet room to reduce the spinning sensation.  Sit down as soon as you feel dizzy.  Squat down instead of bending over at the waist when picking something up.  Turn on the lights if you get up during the night.  Use a cane or walking stick if you feel like you might fall.    Call the office if there are no improvements in symptoms.   Consider referral to ENT if no " improvement    Go to the ER if you should have any of the following:  Chest pain.  Heart palpitations.  Sudden headache.  Difficulty walking.  A fever over 100.4 degrees Fahrenheit (38 degrees Celsius).  Vision changes.  Weakness in one arm or leg.    *This note was dictated using DRAGON speech recognition software and was corrected for spelling or grammatical errors, but despite proofreading several typographical errors might be present that might affect the meaning of the content.*  Perri Silver, CNP

## 2024-05-09 NOTE — PATIENT INSTRUCTIONS
HYPERTENSION:   Decrease intake of processed foods, fast foods, lunch meat, canned soups, canned veggies.  Increase intake of fresh fruits, veggies, and lean meats. Monitor blood pressure at home, keep a log and bring this with you to your next appointment. Call the office if your blood pressure runs 150/90 or higher consistently.  Blood Pressure Technique:  Sit quietly in a chair for 5 minutes with back supported and feet on the floor  Then place left arm on a table or armrest so bicep area is at the same level of heart or left breast  Check three times in a row, disregard the highest reading and average the other two    Vertigo:    Move slowly when standing up, turning your head or performing other triggering movements.  Sleep with your head elevated on two pillows.  Lie in a dark, quiet room to reduce the spinning sensation.  Sit down as soon as you feel dizzy.  Squat down instead of bending over at the waist when picking something up.  Turn on the lights if you get up during the night.  Use a cane or walking stick if you feel like you might fall.    Call the office if there are no improvements in symptoms.   Consider referral to ENT if no improvement    Go to the ER if you should have any of the following:  Chest pain.  Heart palpitations.  Sudden headache.  Difficulty walking.  A fever over 100.4 degrees Fahrenheit (38 degrees Celsius).  Vision changes.  Weakness in one arm or leg.

## 2024-05-10 ENCOUNTER — TELEPHONE (OUTPATIENT)
Dept: PRIMARY CARE | Facility: CLINIC | Age: 49
End: 2024-05-10

## 2024-05-10 ENCOUNTER — APPOINTMENT (OUTPATIENT)
Dept: RADIOLOGY | Facility: HOSPITAL | Age: 49
End: 2024-05-10
Payer: COMMERCIAL

## 2024-05-10 ENCOUNTER — HOSPITAL ENCOUNTER (EMERGENCY)
Facility: HOSPITAL | Age: 49
Discharge: HOME | End: 2024-05-10
Attending: STUDENT IN AN ORGANIZED HEALTH CARE EDUCATION/TRAINING PROGRAM
Payer: COMMERCIAL

## 2024-05-10 ENCOUNTER — APPOINTMENT (OUTPATIENT)
Dept: CARDIOLOGY | Facility: HOSPITAL | Age: 49
End: 2024-05-10
Payer: COMMERCIAL

## 2024-05-10 VITALS
HEART RATE: 77 BPM | HEIGHT: 61 IN | DIASTOLIC BLOOD PRESSURE: 103 MMHG | SYSTOLIC BLOOD PRESSURE: 168 MMHG | TEMPERATURE: 98.2 F | BODY MASS INDEX: 18.88 KG/M2 | OXYGEN SATURATION: 100 % | RESPIRATION RATE: 16 BRPM | WEIGHT: 100 LBS

## 2024-05-10 DIAGNOSIS — R42 VERTIGO: Primary | ICD-10-CM

## 2024-05-10 DIAGNOSIS — I10 HYPERTENSION, UNSPECIFIED TYPE: ICD-10-CM

## 2024-05-10 LAB
ALBUMIN SERPL-MCNC: 4.5 G/DL (ref 3.5–5)
ALP BLD-CCNC: 87 U/L (ref 35–125)
ALT SERPL-CCNC: 12 U/L (ref 5–40)
ANION GAP SERPL CALC-SCNC: 18 MMOL/L
APPEARANCE UR: CLEAR
AST SERPL-CCNC: 23 U/L (ref 5–40)
BASOPHILS # BLD AUTO: 0.05 X10*3/UL (ref 0–0.1)
BASOPHILS NFR BLD AUTO: 0.5 %
BILIRUB SERPL-MCNC: 0.2 MG/DL (ref 0.1–1.2)
BILIRUB UR STRIP.AUTO-MCNC: NEGATIVE MG/DL
BUN SERPL-MCNC: 7 MG/DL (ref 8–25)
CALCIUM SERPL-MCNC: 9.6 MG/DL (ref 8.5–10.4)
CHLORIDE SERPL-SCNC: 102 MMOL/L (ref 97–107)
CO2 SERPL-SCNC: 21 MMOL/L (ref 24–31)
COLOR UR: COLORLESS
CREAT SERPL-MCNC: 0.5 MG/DL (ref 0.4–1.6)
EGFRCR SERPLBLD CKD-EPI 2021: >90 ML/MIN/1.73M*2
EOSINOPHIL # BLD AUTO: 0.05 X10*3/UL (ref 0–0.7)
EOSINOPHIL NFR BLD AUTO: 0.5 %
ERYTHROCYTE [DISTWIDTH] IN BLOOD BY AUTOMATED COUNT: 13 % (ref 11.5–14.5)
GLUCOSE SERPL-MCNC: 110 MG/DL (ref 65–99)
GLUCOSE UR STRIP.AUTO-MCNC: NORMAL MG/DL
HCT VFR BLD AUTO: 39.4 % (ref 36–46)
HGB BLD-MCNC: 13.6 G/DL (ref 12–16)
IMM GRANULOCYTES # BLD AUTO: 0.03 X10*3/UL (ref 0–0.7)
IMM GRANULOCYTES NFR BLD AUTO: 0.3 % (ref 0–0.9)
INR PPP: 1 (ref 0.9–1.2)
KETONES UR STRIP.AUTO-MCNC: NEGATIVE MG/DL
LEUKOCYTE ESTERASE UR QL STRIP.AUTO: NEGATIVE
LYMPHOCYTES # BLD AUTO: 1.71 X10*3/UL (ref 1.2–4.8)
LYMPHOCYTES NFR BLD AUTO: 17.2 %
MCH RBC QN AUTO: 34 PG (ref 26–34)
MCHC RBC AUTO-ENTMCNC: 34.5 G/DL (ref 32–36)
MCV RBC AUTO: 99 FL (ref 80–100)
MONOCYTES # BLD AUTO: 0.89 X10*3/UL (ref 0.1–1)
MONOCYTES NFR BLD AUTO: 9 %
NEUTROPHILS # BLD AUTO: 7.19 X10*3/UL (ref 1.2–7.7)
NEUTROPHILS NFR BLD AUTO: 72.5 %
NITRITE UR QL STRIP.AUTO: NEGATIVE
NRBC BLD-RTO: 0 /100 WBCS (ref 0–0)
PH UR STRIP.AUTO: 6 [PH]
PLATELET # BLD AUTO: 343 X10*3/UL (ref 150–450)
POTASSIUM SERPL-SCNC: 3.3 MMOL/L (ref 3.4–5.1)
PROT SERPL-MCNC: 7 G/DL (ref 5.9–7.9)
PROT UR STRIP.AUTO-MCNC: NEGATIVE MG/DL
PROTHROMBIN TIME: 10.5 SECONDS (ref 9.3–12.7)
RBC # BLD AUTO: 4 X10*6/UL (ref 4–5.2)
RBC # UR STRIP.AUTO: NEGATIVE /UL
SODIUM SERPL-SCNC: 141 MMOL/L (ref 133–145)
SP GR UR STRIP.AUTO: 1
TROPONIN T SERPL-MCNC: 6 NG/L
TROPONIN T SERPL-MCNC: 8 NG/L
UROBILINOGEN UR STRIP.AUTO-MCNC: NORMAL MG/DL
WBC # BLD AUTO: 9.9 X10*3/UL (ref 4.4–11.3)

## 2024-05-10 PROCEDURE — 70450 CT HEAD/BRAIN W/O DYE: CPT | Performed by: RADIOLOGY

## 2024-05-10 PROCEDURE — 93005 ELECTROCARDIOGRAM TRACING: CPT

## 2024-05-10 PROCEDURE — 36415 COLL VENOUS BLD VENIPUNCTURE: CPT | Performed by: STUDENT IN AN ORGANIZED HEALTH CARE EDUCATION/TRAINING PROGRAM

## 2024-05-10 PROCEDURE — 70450 CT HEAD/BRAIN W/O DYE: CPT

## 2024-05-10 PROCEDURE — 81003 URINALYSIS AUTO W/O SCOPE: CPT | Performed by: STUDENT IN AN ORGANIZED HEALTH CARE EDUCATION/TRAINING PROGRAM

## 2024-05-10 PROCEDURE — 85610 PROTHROMBIN TIME: CPT | Performed by: STUDENT IN AN ORGANIZED HEALTH CARE EDUCATION/TRAINING PROGRAM

## 2024-05-10 PROCEDURE — 85025 COMPLETE CBC W/AUTO DIFF WBC: CPT | Performed by: STUDENT IN AN ORGANIZED HEALTH CARE EDUCATION/TRAINING PROGRAM

## 2024-05-10 PROCEDURE — 84484 ASSAY OF TROPONIN QUANT: CPT | Performed by: STUDENT IN AN ORGANIZED HEALTH CARE EDUCATION/TRAINING PROGRAM

## 2024-05-10 PROCEDURE — 2500000004 HC RX 250 GENERAL PHARMACY W/ HCPCS (ALT 636 FOR OP/ED): Performed by: PHYSICIAN ASSISTANT

## 2024-05-10 PROCEDURE — 80053 COMPREHEN METABOLIC PANEL: CPT | Performed by: STUDENT IN AN ORGANIZED HEALTH CARE EDUCATION/TRAINING PROGRAM

## 2024-05-10 PROCEDURE — 71046 X-RAY EXAM CHEST 2 VIEWS: CPT

## 2024-05-10 PROCEDURE — 2500000001 HC RX 250 WO HCPCS SELF ADMINISTERED DRUGS (ALT 637 FOR MEDICARE OP): Performed by: PHYSICIAN ASSISTANT

## 2024-05-10 PROCEDURE — 71046 X-RAY EXAM CHEST 2 VIEWS: CPT | Performed by: RADIOLOGY

## 2024-05-10 PROCEDURE — 99285 EMERGENCY DEPT VISIT HI MDM: CPT | Mod: 25

## 2024-05-10 RX ORDER — MECLIZINE HYDROCHLORIDE 25 MG/1
25 TABLET ORAL ONCE
Status: COMPLETED | OUTPATIENT
Start: 2024-05-10 | End: 2024-05-10

## 2024-05-10 RX ORDER — AMLODIPINE BESYLATE 10 MG/1
10 TABLET ORAL DAILY
Qty: 30 TABLET | Refills: 0 | Status: SHIPPED | OUTPATIENT
Start: 2024-05-10 | End: 2024-05-29 | Stop reason: ALTCHOICE

## 2024-05-10 RX ADMIN — SODIUM CHLORIDE 1000 ML: 900 INJECTION, SOLUTION INTRAVENOUS at 14:50

## 2024-05-10 RX ADMIN — MECLIZINE HYDROCHLORIDE 25 MG: 25 TABLET ORAL at 14:49

## 2024-05-10 ASSESSMENT — PAIN SCALES - GENERAL
PAINLEVEL_OUTOF10: 0 - NO PAIN

## 2024-05-10 ASSESSMENT — LIFESTYLE VARIABLES
EVER HAD A DRINK FIRST THING IN THE MORNING TO STEADY YOUR NERVES TO GET RID OF A HANGOVER: NO
EVER FELT BAD OR GUILTY ABOUT YOUR DRINKING: NO
TOTAL SCORE: 0
HAVE PEOPLE ANNOYED YOU BY CRITICIZING YOUR DRINKING: NO
HAVE YOU EVER FELT YOU SHOULD CUT DOWN ON YOUR DRINKING: NO

## 2024-05-10 ASSESSMENT — PAIN - FUNCTIONAL ASSESSMENT: PAIN_FUNCTIONAL_ASSESSMENT: 0-10

## 2024-05-10 NOTE — ED PROVIDER NOTES
HPI   Chief Complaint   Patient presents with    Dizziness    Hypertension       49-year-old female presented emergency department with a chief complaint of dizziness described as the room spinning.  It seems worse with position change and movement of the head.  She has a tingling sensation in her left arm as well.  She saw her primary doctor yesterday for these issues.  She was started on meclizine which she has not taken.  Her blood pressure was elevated in the 150 range systolically and this prompted her emergency department visit.  Additionally her blood pressure was addressed at her primary doctor visit and she had her amlodipine increased from 5-10 which she has not started.  She denies chest pain, shortness of breath, abdominal pain, dysuria diarrhea.  Well-appearing nontoxic afebrile.  No other complaint.                          [place] Coma Scale Score: 15         NIH Stroke Scale: 0             Patient History   Past Medical History:   Diagnosis Date    Arthritis     Chronic night sweats     Diverticulitis     Fibromyalgia     Gastritis     GERD (gastroesophageal reflux disease)     Herpes     HPV (human papilloma virus) infection     Hyperhidrosis     hands, feet and armpits    Hypertension     Overactive bladder     Small intestinal bacterial overgrowth (SIBO)      Past Surgical History:   Procedure Laterality Date    APPENDECTOMY      BLADDER SUSPENSION      BREAST LUMPECTOMY Left     22 years old, benign tumor removed    PARTIAL HYSTERECTOMY       Family History   Problem Relation Name Age of Onset    Hypertension Mother      Other (sarcoma left leg metastastasized) Mother      Hypertension Father      Heart attack Father      Basal cell carcinoma Sister      Other (non- melenoma skin cancer) Sister      Basal cell carcinoma Sister      Osteoporosis Other grandmother     Other (gyn related cancers) Other fam hx      Social History     Tobacco Use    Smoking status: Every Day     Current packs/day:  1.00     Types: Cigarettes     Passive exposure: Current    Smokeless tobacco: Never   Vaping Use    Vaping status: Never Used   Substance Use Topics    Alcohol use: Yes     Alcohol/week: 2.0 standard drinks of alcohol     Types: 2 Shots of liquor per week     Comment: rarely    Drug use: Never       Physical Exam   ED Triage Vitals [05/10/24 1412]   Temperature Heart Rate Respirations BP   36.8 °C (98.2 °F) 90 18 (!) 155/107      Pulse Ox Temp Source Heart Rate Source Patient Position   99 % Oral Monitor Sitting      BP Location FiO2 (%)     Right arm --       Physical Exam  Vitals and nursing note reviewed.   Constitutional:       Appearance: Normal appearance.   HENT:      Head: Normocephalic.      Nose: Nose normal.      Mouth/Throat:      Mouth: Mucous membranes are moist.   Cardiovascular:      Rate and Rhythm: Normal rate and regular rhythm.      Pulses: Normal pulses.   Pulmonary:      Effort: Pulmonary effort is normal.   Abdominal:      General: Abdomen is flat.   Musculoskeletal:         General: Normal range of motion.      Cervical back: Normal range of motion.   Skin:     General: Skin is warm and dry.   Neurological:      General: No focal deficit present.      Mental Status: She is alert and oriented to person, place, and time.      Cranial Nerves: No cranial nerve deficit.      Sensory: No sensory deficit.   Psychiatric:         Behavior: Behavior normal.         ED Course & MDM   ED Course as of 05/10/24 1731   Fri May 10, 2024   1426 ECG 12 lead  Performed at  1422, HR of 79, NSR, NAD, QTc 433, no sign of STEMI, no Q wave or T wave abnormality noted.    Reviewed and interpreted by me at time performed   [JM]      ED Course User Index  [JM] Harriet Zaragoza MD         Diagnoses as of 05/10/24 1731   Vertigo   Hypertension, unspecified type       Medical Decision Making  I have seen and evaluated this patient.  The attending physician has also seen and evaluated this patient.  Vital signs,  laboratory testing and diagnostic images if applicable have been reviewed.  All laboratory and imaging is interpreted by myself unless otherwise stated.  Radiology studies are also formally interpreted by radiologist.    CBC without significant leukocytosis or anemia, metabolic panel without significant renal impairment or electrolyte abnormality.  Troponin within an appropriate range without significant delta change, chest x-ray without actionable cardiopulmonary process, EKG without evidence of acute ischemia.  CT brain negative.  Patient has improvement of symptoms.  Most consistent with a peripheral vertigo.  Patient given paper prescription for her 10 mg amlodipine unclear why she couldn't have just taken two 5 mg doses.  Released in good condition.    Labs Reviewed  COMPREHENSIVE METABOLIC PANEL - Abnormal     Glucose                       110 (*)                Sodium                        141                    Potassium                     3.3 (*)                Chloride                      102                    Bicarbonate                   21 (*)                 Urea Nitrogen                 7 (*)                  Creatinine                    0.50                   eGFR                          >90                    Calcium                       9.6                    Albumin                       4.5                    Alkaline Phosphatase          87                     Total Protein                 7.0                    AST                           23                     Bilirubin, Total              0.2                    ALT                           12                     Anion Gap                     18                  URINALYSIS WITH REFLEX MICROSCOPIC - Abnormal     Color, Urine                  Colorless (*)               Appearance, Urine             Clear                  Specific Gravity, Urine       1.002 (*)               pH, Urine                     6.0                    Protein,  Urine                NEGATIVE                Glucose, Urine                Normal                 Blood, Urine                  NEGATIVE                Ketones, Urine                NEGATIVE                Bilirubin, Urine              NEGATIVE                Urobilinogen, Urine           Normal                 Nitrite, Urine                NEGATIVE                Leukocyte Esterase, Urine     NEGATIVE             PROTIME-INR - Normal     Protime                       10.5                   INR                           1.0                      Narrative: INR Therapeutic Range: 2.0-3.5  SERIAL TROPONIN, INITIAL ([facility]) - Normal     Troponin T, High Sensitivity   6                   SERIAL TROPONIN,  2 HOUR ([facility]) - Normal     Troponin T, High Sensitivity   8                   CBC WITH AUTO DIFFERENTIAL     WBC                           9.9                    nRBC                          0.0                    RBC                           4.00                   Hemoglobin                    13.6                   Hematocrit                    39.4                   MCV                           99                     MCH                           34.0                   MCHC                          34.5                   RDW                           13.0                   Platelets                     343                    Neutrophils %                 72.5                   Immature Granulocytes %, Automated   0.3                    Lymphocytes %                 17.2                   Monocytes %                   9.0                    Eosinophils %                 0.5                    Basophils %                   0.5                    Neutrophils Absolute          7.19                   Immature Granulocytes Absolute, Au*   0.03                   Lymphocytes Absolute          1.71                   Monocytes Absolute            0.89                   Eosinophils Absolute          0.05                    Basophils Absolute            0.05                TROPONIN T SERIES, HIGH SENSITIVITY (0, 2 HR, 6 HR)  CT head wo IV contrast   Final Result    No evidence of acute cortical infarct or intracranial hemorrhage.          No evidence of intracranial hemorrhage or displaced skull fracture.          Signed by: Brijesh Pitt 5/10/2024 3:46 PM    Dictation workstation:   [id number]     XR chest 2 views   Final Result    Mild accentuation of dorsal kyphosis. Mild midthoracic spine DJD.          Multiple old bilateral rib fracture deformities.          Remainder of the exam was negative.          MACRO:    None          Signed by: Nitish Beaulieu 5/10/2024 3:26 PM    Dictation workstation:   [id number]     Medications  sodium chloride 0.9 % bolus 1,000 mL (0 mL intravenous Stopped 5/10/24 1520)   meclizine (Antivert) tablet 25 mg (25 mg oral Given 5/10/24 6125)  Discharge Medication List as of 5/10/2024  5:05 PM    START taking these medications    !! amLODIPine (Norvasc) 10 mg tablet  Take 1 tablet (10 mg) by mouth once daily., Starting Fri 5/10/2024, Until Sun 6/9/2024, Print    !! - Potential duplicate medications found. Please discuss with provider.                  Procedure  Procedures     Manuel Sánchez PA-C  05/10/24 4716

## 2024-05-10 NOTE — TELEPHONE ENCOUNTER
Patient called c/o high blood pressures, left side of face tingling/numbness and left leg tingling and numbness. I advised that the patient go to the ER. She said that she figured but wanted to make sure.

## 2024-05-13 LAB
ATRIAL RATE: 79 BPM
P AXIS: 63 DEGREES
P OFFSET: 185 MS
P ONSET: 144 MS
PR INTERVAL: 126 MS
Q ONSET: 207 MS
QRS COUNT: 13 BEATS
QRS DURATION: 84 MS
QT INTERVAL: 378 MS
QTC CALCULATION(BAZETT): 433 MS
QTC FREDERICIA: 414 MS
R AXIS: -5 DEGREES
T AXIS: 49 DEGREES
T OFFSET: 396 MS
VENTRICULAR RATE: 79 BPM

## 2024-05-13 NOTE — TELEPHONE ENCOUNTER
Spoke with the patient. She said that she went to ER via squad to [facility]. She told them about her vertigo and they told her that wasn't what was wrong with her. Her BP was elevated the whole time she was there but they were unwilling to give her anything to bring it down. She said that they were pretty rude her the whole time. The last reading before discharging her was 167/103. She is checking her BP at home and the top number is getting way better but her bottom number is averaging 100-110. She is still having tingling on the left side of face and in her left leg and she is still having vertigo symptoms, she is not sure if you can send in something for this? Please advise.

## 2024-05-14 NOTE — TELEPHONE ENCOUNTER
I spoke with the patient and let her know the message. She will think about seeing a neurologist. Advised that she call us back if she decides she would like us to place a referral. She has a call into her psychiatrist so that she can put her on a medication to help with her PTSD dreams which will also help lower BP. The psychiatrist has been trying to get her to take this medication for a while now but she has been scared to. She would like to try it so she is waiting for them to call her back.

## 2024-05-21 ENCOUNTER — APPOINTMENT (OUTPATIENT)
Dept: PRIMARY CARE | Facility: CLINIC | Age: 49
End: 2024-05-21
Payer: COMMERCIAL

## 2024-05-24 ENCOUNTER — TELEPHONE (OUTPATIENT)
Dept: PRIMARY CARE | Facility: CLINIC | Age: 49
End: 2024-05-24
Payer: COMMERCIAL

## 2024-05-24 NOTE — TELEPHONE ENCOUNTER
Pt called wanted to know if you are familiar with the medication prazosin , she was prescribed to take 2 mg tabs 1-2 tabs at night, she was given the Rx from Dr. Miguel to help with her sleep she was also advised it can lower her BP .She also upped her BP med's from 5 mg to 10 mg

## 2024-05-29 ENCOUNTER — TELEMEDICINE (OUTPATIENT)
Dept: PRIMARY CARE | Facility: CLINIC | Age: 49
End: 2024-05-29
Payer: COMMERCIAL

## 2024-05-29 VITALS
BODY MASS INDEX: 18.88 KG/M2 | WEIGHT: 100 LBS | DIASTOLIC BLOOD PRESSURE: 86 MMHG | HEIGHT: 61 IN | SYSTOLIC BLOOD PRESSURE: 149 MMHG

## 2024-05-29 DIAGNOSIS — M81.0 AGE-RELATED OSTEOPOROSIS WITHOUT CURRENT PATHOLOGICAL FRACTURE: ICD-10-CM

## 2024-05-29 DIAGNOSIS — I10 BENIGN HYPERTENSION: ICD-10-CM

## 2024-05-29 DIAGNOSIS — E46 PROTEIN-CALORIE MALNUTRITION, UNSPECIFIED SEVERITY (MULTI): Primary | ICD-10-CM

## 2024-05-29 DIAGNOSIS — F33.9 DEPRESSION, RECURRENT (CMS-HCC): ICD-10-CM

## 2024-05-29 PROCEDURE — 99441 PR PHYS/QHP TELEPHONE EVALUATION 5-10 MIN: CPT

## 2024-05-29 PROCEDURE — 4004F PT TOBACCO SCREEN RCVD TLK: CPT

## 2024-05-29 PROCEDURE — 3077F SYST BP >= 140 MM HG: CPT

## 2024-05-29 PROCEDURE — 3079F DIAST BP 80-89 MM HG: CPT

## 2024-05-29 RX ORDER — HYDROCHLOROTHIAZIDE 12.5 MG/1
12.5 TABLET ORAL DAILY
Qty: 30 TABLET | Refills: 2 | Status: SHIPPED | OUTPATIENT
Start: 2024-05-29 | End: 2024-08-27

## 2024-05-29 RX ORDER — PRAZOSIN HYDROCHLORIDE 2 MG/1
2 CAPSULE ORAL NIGHTLY
COMMUNITY
End: 2024-05-29 | Stop reason: SINTOL

## 2024-05-29 ASSESSMENT — PATIENT HEALTH QUESTIONNAIRE - PHQ9
SUM OF ALL RESPONSES TO PHQ9 QUESTIONS 1 AND 2: 4
5. POOR APPETITE OR OVEREATING: MORE THAN HALF THE DAYS
2. FEELING DOWN, DEPRESSED OR HOPELESS: MORE THAN HALF THE DAYS
7. TROUBLE CONCENTRATING ON THINGS, SUCH AS READING THE NEWSPAPER OR WATCHING TELEVISION: MORE THAN HALF THE DAYS
SUM OF ALL RESPONSES TO PHQ QUESTIONS 1-9: 14
10. IF YOU CHECKED OFF ANY PROBLEMS, HOW DIFFICULT HAVE THESE PROBLEMS MADE IT FOR YOU TO DO YOUR WORK, TAKE CARE OF THINGS AT HOME, OR GET ALONG WITH OTHER PEOPLE: SOMEWHAT DIFFICULT
8. MOVING OR SPEAKING SO SLOWLY THAT OTHER PEOPLE COULD HAVE NOTICED. OR THE OPPOSITE, BEING SO FIGETY OR RESTLESS THAT YOU HAVE BEEN MOVING AROUND A LOT MORE THAN USUAL: NOT AT ALL
1. LITTLE INTEREST OR PLEASURE IN DOING THINGS: MORE THAN HALF THE DAYS
4. FEELING TIRED OR HAVING LITTLE ENERGY: MORE THAN HALF THE DAYS
3. TROUBLE FALLING OR STAYING ASLEEP OR SLEEPING TOO MUCH: MORE THAN HALF THE DAYS
6. FEELING BAD ABOUT YOURSELF - OR THAT YOU ARE A FAILURE OR HAVE LET YOURSELF OR YOUR FAMILY DOWN: MORE THAN HALF THE DAYS
9. THOUGHTS THAT YOU WOULD BE BETTER OFF DEAD, OR OF HURTING YOURSELF: NOT AT ALL

## 2024-05-29 ASSESSMENT — COLUMBIA-SUICIDE SEVERITY RATING SCALE - C-SSRS
2. HAVE YOU ACTUALLY HAD ANY THOUGHTS OF KILLING YOURSELF?: NO
6. HAVE YOU EVER DONE ANYTHING, STARTED TO DO ANYTHING, OR PREPARED TO DO ANYTHING TO END YOUR LIFE?: NO
1. IN THE PAST MONTH, HAVE YOU WISHED YOU WERE DEAD OR WISHED YOU COULD GO TO SLEEP AND NOT WAKE UP?: NO

## 2024-05-29 ASSESSMENT — PAIN SCALES - GENERAL: PAINLEVEL: 8

## 2024-05-29 NOTE — PATIENT INSTRUCTIONS
HYPERLIPIDEMIA:  Decrease intake of saturated fats, fast food, sweets.  Increase intake of fresh fruit fresh vegetables and lean meats.  Increase healthy fats seeds, nuts, olive oil instead of butter.  walk 150 minutes/week for heart health.  HYPERTENSION:   Decrease intake of processed foods, fast foods, lunch meat, canned soups, canned veggies.  Increase intake of fresh fruits, veggies, and lean meats. Monitor blood pressure at home, keep a log and bring this with you to your next appointment. Call the office if your blood pressure runs 150/90 or higher consistently.  Blood Pressure Technique:  Sit quietly in a chair for 5 minutes with back supported and feet on the floor  Then place left arm on a table or armrest so bicep area is at the same level of heart or left breast  Check three times in a row, disregard the highest reading and average the other two

## 2024-05-29 NOTE — PROGRESS NOTES
With patients permission, this is a Telemedicine visit with video. Provider located at the office address. Patient located at their home address. The provider and patient participated in this telemedicine encounter.        Subjective   Patient ID: Julee Hinojosa is a 49 y.o. female who presents for discuss medication.    HPI   Patient is being seen today for blood pressure follow-up she notes average blood sugars are running 123-149/86-96.  She was put on prazosin by psychology for PTSD type nightmares she took it 1 time and said she felt extremely lightheaded and dizzy so she is no longer taking it I advise she reach out to them discuss.   She will also need a referral printed and mailed to her for rheumatology  Review of Systems  Review of Systems negative except as noted in HPI and Chief complaint.,    Current Outpatient Medications:     ALPRAZolam (Xanax) 0.5 mg tablet, Take 1 tablet (0.5 mg) by mouth as needed at bedtime., Disp: , Rfl:     amLODIPine (Norvasc) 10 mg tablet, Take 1 tablet (10 mg) by mouth once daily., Disp: 30 tablet, Rfl: 5    calcium carbonate-vitamin D3 600 mg-10 mcg (400 unit) tablet, , Disp: , Rfl:     cholecalciferol (Vitamin D-3) 10 mcg (400 unit) tablet,chewable, Chew 2 tablets (800 Units) once daily., Disp: 180 tablet, Rfl: 3    DULoxetine (Cymbalta) 60 mg DR capsule, Take 1 capsule (60 mg) by mouth 2 times a day., Disp: , Rfl:     meclizine (Antivert) 12.5 mg tablet, Take 1 tablet (12.5 mg) by mouth 3 times a day as needed for dizziness., Disp: 30 tablet, Rfl: 11    ondansetron (Zofran) 4 mg tablet, Take 1 tablet (4 mg) by mouth every 8 hours if needed for nausea or vomiting., Disp: , Rfl:     pantoprazole (ProtoNix) 40 mg EC tablet, Take 1 tablet (40 mg) by mouth once daily in the morning. Take before meals., Disp: , Rfl:     traZODone (Desyrel) 100 mg tablet, Take 1 tablet (100 mg) by mouth once daily at bedtime., Disp: , Rfl:     valACYclovir (Valtrex) 500 mg tablet, Take 1  "tablet (500 mg) by mouth once daily., Disp: , Rfl:     hydroCHLOROthiazide (Microzide) 12.5 mg tablet, Take 1 tablet (12.5 mg) by mouth once daily., Disp: 30 tablet, Rfl: 2    Objective   /86 (BP Location: Left arm, Patient Position: Sitting, BP Cuff Size: Adult)   Ht 1.549 m (5' 1\")   Wt 45.4 kg (100 lb)   BMI 18.89 kg/m²     Physical Exam  Physical exam not performed due to telemedicine encounter.   Assessment/Plan   Diagnoses and all orders for this visit:  Protein-calorie malnutrition, unspecified severity (Multi)  Depression, recurrent (CMS-HCC)  Benign hypertension  -     hydroCHLOROthiazide (Microzide) 12.5 mg tablet; Take 1 tablet (12.5 mg) by mouth once daily.  Age-related osteoporosis without current pathological fracture  -     Referral to Rheumatology; Future    HYPERLIPIDEMIA:  Decrease intake of saturated fats, fast food, sweets.  Increase intake of fresh fruit fresh vegetables and lean meats.  Increase healthy fats seeds, nuts, olive oil instead of butter.  walk 150 minutes/week for heart health.  HYPERTENSION:   Decrease intake of processed foods, fast foods, lunch meat, canned soups, canned veggies.  Increase intake of fresh fruits, veggies, and lean meats. Monitor blood pressure at home, keep a log and bring this with you to your next appointment. Call the office if your blood pressure runs 150/90 or higher consistently.  Blood Pressure Technique:  Sit quietly in a chair for 5 minutes with back supported and feet on the floor  Then place left arm on a table or armrest so bicep area is at the same level of heart or left breast  Check three times in a row, disregard the highest reading and average the other two  *This note was dictated using DRAGON speech recognition software and was corrected for spelling or grammatical errors, but despite proofreading several typographical errors might be present that might affect the meaning of the content.*  Perri Silver, CNP       "

## 2024-06-03 ENCOUNTER — TELEPHONE (OUTPATIENT)
Dept: PRIMARY CARE | Facility: CLINIC | Age: 49
End: 2024-06-03
Payer: COMMERCIAL

## 2024-06-03 NOTE — TELEPHONE ENCOUNTER
"Patient called and she has been getting bruises some she said \"severe\" and not knowing where she is getting them from. She is wondering if this could be due to the increase in BP medication?  "

## 2024-06-06 ENCOUNTER — LAB (OUTPATIENT)
Dept: LAB | Facility: LAB | Age: 49
End: 2024-06-06
Payer: COMMERCIAL

## 2024-06-06 ENCOUNTER — OFFICE VISIT (OUTPATIENT)
Dept: PRIMARY CARE | Facility: CLINIC | Age: 49
End: 2024-06-06
Payer: COMMERCIAL

## 2024-06-06 VITALS
WEIGHT: 99.6 LBS | OXYGEN SATURATION: 98 % | DIASTOLIC BLOOD PRESSURE: 88 MMHG | HEART RATE: 89 BPM | SYSTOLIC BLOOD PRESSURE: 128 MMHG | BODY MASS INDEX: 18.82 KG/M2

## 2024-06-06 DIAGNOSIS — E53.8 B12 DEFICIENCY: ICD-10-CM

## 2024-06-06 DIAGNOSIS — I10 PRIMARY HYPERTENSION: ICD-10-CM

## 2024-06-06 DIAGNOSIS — F33.9 DEPRESSION, RECURRENT (CMS-HCC): ICD-10-CM

## 2024-06-06 DIAGNOSIS — R00.2 PALPITATIONS: ICD-10-CM

## 2024-06-06 DIAGNOSIS — R23.3 ABNORMAL BRUISING: ICD-10-CM

## 2024-06-06 DIAGNOSIS — R23.3 ABNORMAL BRUISING: Primary | ICD-10-CM

## 2024-06-06 LAB
APTT PPP: 27.5 SECONDS (ref 22–32.5)
BASOPHILS # BLD AUTO: 0.05 X10*3/UL (ref 0–0.1)
BASOPHILS NFR BLD AUTO: 0.5 %
EOSINOPHIL # BLD AUTO: 0.11 X10*3/UL (ref 0–0.7)
EOSINOPHIL NFR BLD AUTO: 1.1 %
ERYTHROCYTE [DISTWIDTH] IN BLOOD BY AUTOMATED COUNT: 13.7 % (ref 11.5–14.5)
FIBRINOGEN PPP-MCNC: 213 MG/DL (ref 200–400)
HCT VFR BLD AUTO: 40.8 % (ref 36–46)
HGB BLD-MCNC: 13.7 G/DL (ref 12–16)
IMM GRANULOCYTES # BLD AUTO: 0.03 X10*3/UL (ref 0–0.7)
IMM GRANULOCYTES NFR BLD AUTO: 0.3 % (ref 0–0.9)
INR PPP: 1 (ref 0.9–1.2)
LYMPHOCYTES # BLD AUTO: 2.29 X10*3/UL (ref 1.2–4.8)
LYMPHOCYTES NFR BLD AUTO: 23 %
MCH RBC QN AUTO: 34 PG (ref 26–34)
MCHC RBC AUTO-ENTMCNC: 33.6 G/DL (ref 32–36)
MCV RBC AUTO: 101 FL (ref 80–100)
MONOCYTES # BLD AUTO: 0.71 X10*3/UL (ref 0.1–1)
MONOCYTES NFR BLD AUTO: 7.1 %
NEUTROPHILS # BLD AUTO: 6.76 X10*3/UL (ref 1.2–7.7)
NEUTROPHILS NFR BLD AUTO: 68 %
NRBC BLD-RTO: 0 /100 WBCS (ref 0–0)
PLATELET # BLD AUTO: 373 X10*3/UL (ref 150–450)
PROTHROMBIN TIME: 10.3 SECONDS (ref 9.3–12.7)
RBC # BLD AUTO: 4.03 X10*6/UL (ref 4–5.2)
VIT B12 SERPL-MCNC: 552 PG/ML (ref 211–946)
WBC # BLD AUTO: 10 X10*3/UL (ref 4.4–11.3)

## 2024-06-06 PROCEDURE — 36415 COLL VENOUS BLD VENIPUNCTURE: CPT

## 2024-06-06 PROCEDURE — 99214 OFFICE O/P EST MOD 30 MIN: CPT

## 2024-06-06 PROCEDURE — 3074F SYST BP LT 130 MM HG: CPT

## 2024-06-06 PROCEDURE — 85730 THROMBOPLASTIN TIME PARTIAL: CPT

## 2024-06-06 PROCEDURE — 4004F PT TOBACCO SCREEN RCVD TLK: CPT

## 2024-06-06 PROCEDURE — 3079F DIAST BP 80-89 MM HG: CPT

## 2024-06-06 PROCEDURE — 85610 PROTHROMBIN TIME: CPT

## 2024-06-06 PROCEDURE — 82607 VITAMIN B-12: CPT

## 2024-06-06 PROCEDURE — 85384 FIBRINOGEN ACTIVITY: CPT

## 2024-06-06 PROCEDURE — 85025 COMPLETE CBC W/AUTO DIFF WBC: CPT

## 2024-06-06 ASSESSMENT — LIFESTYLE VARIABLES
HOW OFTEN DO YOU HAVE A DRINK CONTAINING ALCOHOL: MONTHLY OR LESS
AUDIT-C TOTAL SCORE: 1
HOW MANY STANDARD DRINKS CONTAINING ALCOHOL DO YOU HAVE ON A TYPICAL DAY: PATIENT DOES NOT DRINK
HOW OFTEN DO YOU HAVE SIX OR MORE DRINKS ON ONE OCCASION: NEVER
SKIP TO QUESTIONS 9-10: 1

## 2024-06-06 ASSESSMENT — ENCOUNTER SYMPTOMS
OCCASIONAL FEELINGS OF UNSTEADINESS: 0
DEPRESSION: 0
LOSS OF SENSATION IN FEET: 0

## 2024-06-06 ASSESSMENT — PAIN SCALES - GENERAL: PAINLEVEL: 8

## 2024-06-06 ASSESSMENT — PATIENT HEALTH QUESTIONNAIRE - PHQ9
2. FEELING DOWN, DEPRESSED OR HOPELESS: NOT AT ALL
SUM OF ALL RESPONSES TO PHQ9 QUESTIONS 1 AND 2: 0
1. LITTLE INTEREST OR PLEASURE IN DOING THINGS: NOT AT ALL

## 2024-06-06 NOTE — PROGRESS NOTES
"Subjective   Patient ID: Julee Hinojosa is a 49 y.o. female who presents for Bleeding/Bruising.    HPI   Patient has been monitoring blood pressures at home generally running 117-134/70 2/90.  She has not started the hydrochlorothiazide at this time.  Today she is here for increased bruising she noticed for a \"long time\" she bruises easily denies any recent falls injuries bumping into anything but she has a large softball sized bruise on her left bicep.  She has multiple smaller bruising bilateral knees.  She states she did see hematology years ago but has not followed up with them for a long time.  Denies any other excessive bleeding noted.  She does admit to palpitations intermittently with exertion  Review of Systems  Review of Systems negative except as noted in HPI and Chief complaint.    Current Outpatient Medications:     ALPRAZolam (Xanax) 0.5 mg tablet, Take 1 tablet (0.5 mg) by mouth as needed at bedtime., Disp: , Rfl:     amLODIPine (Norvasc) 10 mg tablet, Take 1 tablet (10 mg) by mouth once daily., Disp: 30 tablet, Rfl: 5    calcium carbonate-vitamin D3 600 mg-10 mcg (400 unit) tablet, , Disp: , Rfl:     cholecalciferol (Vitamin D-3) 10 mcg (400 unit) tablet,chewable, Chew 2 tablets (800 Units) once daily., Disp: 180 tablet, Rfl: 3    DULoxetine (Cymbalta) 60 mg DR capsule, Take 1 capsule (60 mg) by mouth 2 times a day., Disp: , Rfl:     hydroCHLOROthiazide (Microzide) 12.5 mg tablet, Take 1 tablet (12.5 mg) by mouth once daily., Disp: 30 tablet, Rfl: 2    ondansetron (Zofran) 4 mg tablet, Take 1 tablet (4 mg) by mouth every 8 hours if needed for nausea or vomiting., Disp: , Rfl:     pantoprazole (ProtoNix) 40 mg EC tablet, Take 1 tablet (40 mg) by mouth once daily in the morning. Take before meals., Disp: , Rfl:     traZODone (Desyrel) 100 mg tablet, Take 1 tablet (100 mg) by mouth once daily at bedtime., Disp: , Rfl:     valACYclovir (Valtrex) 500 mg tablet, Take 1 tablet (500 mg) by mouth once " daily., Disp: , Rfl:     meclizine (Antivert) 12.5 mg tablet, Take 1 tablet (12.5 mg) by mouth 3 times a day as needed for dizziness., Disp: 30 tablet, Rfl: 11    Objective   /88   Pulse 89   Wt 45.2 kg (99 lb 9.6 oz)   SpO2 98%   BMI 18.82 kg/m²     Physical Exam  Vitals reviewed.   Cardiovascular:      Rate and Rhythm: Normal rate.   Pulmonary:      Effort: Pulmonary effort is normal.   Skin:     Findings: Bruising present.          Neurological:      Mental Status: She is alert.         Assessment/Plan   Diagnoses and all orders for this visit:  Abnormal bruising  -     Protime-INR; Future  -     CBC and Auto Differential; Future  -     Fibrinogen; Future  -     APTT; Future  -     Referral to Hematology and Oncology; Future  Palpitations  -     Holter or Event Cardiac Monitor; Future  Depression, recurrent (CMS-HCC)  B12 deficiency  -     Vitamin B12; Future    Bruising  I have referred you to hematology- please call to make an appointment.     LAB Order/ BLOOD TESTS   I have ordered lab work for you to get done. Th If you do not hear from this office within two days of having your labs done, please call for your results.      Palpitations  I have ordered a Holter monitor if you do not receive it in the next week please call the office.     HYPERTENSION:   Decrease intake of processed foods, fast foods, lunch meat, canned soups, canned veggies.  Increase intake of fresh fruits, veggies, and lean meats. Monitor blood pressure at home, keep a log and bring this with you to your next appointment. Call the office if your blood pressure runs 150/90 or higher consistently.  Blood Pressure Technique:  Sit quietly in a chair for 5 minutes with back supported and feet on the floor  Then place left arm on a table or armrest so bicep area is at the same level of heart or left breast  Check three times in a row, disregard the highest reading and average the other two  *This note was dictated using DRAGON speech  recognition software and was corrected for spelling or grammatical errors, but despite proofreading several typographical errors might be present that might affect the meaning of the content.*  Perri Silver, CNP

## 2024-06-06 NOTE — PATIENT INSTRUCTIONS
Bruising  I have referred you to hematology- please call to make an appointment.     LAB Order/ BLOOD TESTS   I have ordered lab work for you to get done. Th If you do not hear from this office within two days of having your labs done, please call for your results.      Palpitations  I have ordered a Holter monitor if you do not receive it in the next week please call the office.     HYPERTENSION:   Decrease intake of processed foods, fast foods, lunch meat, canned soups, canned veggies.  Increase intake of fresh fruits, veggies, and lean meats. Monitor blood pressure at home, keep a log and bring this with you to your next appointment. Call the office if your blood pressure runs 150/90 or higher consistently.  Blood Pressure Technique:  Sit quietly in a chair for 5 minutes with back supported and feet on the floor  Then place left arm on a table or armrest so bicep area is at the same level of heart or left breast  Check three times in a row, disregard the highest reading and average the other two

## 2024-07-20 DIAGNOSIS — I10 BENIGN HYPERTENSION: ICD-10-CM

## 2024-07-22 RX ORDER — HYDROCHLOROTHIAZIDE 12.5 MG/1
12.5 TABLET ORAL DAILY
Qty: 30 TABLET | Refills: 3 | Status: SHIPPED | OUTPATIENT
Start: 2024-07-22

## 2024-08-16 ENCOUNTER — APPOINTMENT (OUTPATIENT)
Dept: PRIMARY CARE | Facility: CLINIC | Age: 49
End: 2024-08-16
Payer: COMMERCIAL

## 2024-09-11 NOTE — PROGRESS NOTES
Subjective   Patient ID:   Julee Hinojosa is a 49 y.o. female who presents for UTI.  HPI  Perri Silver patient.  Here with acute symptoms:  Symptoms x over a week.  Burning with urination.  Urgency.  Pressure.  Blood in the urine.  Urine today shows blood and small leukocytes.    Review of Systems  12 point review of systems negative unless stated above in HPI    Vitals:    09/12/24 0959   BP: 104/88   Pulse: 96   SpO2: 98%     Physical Exam  General: Alert and oriented, well nourished, no acute distress.  Lungs: Clear to auscultation, non-labored respiration.  Heart: Normal rate, regular rhythm, no murmur, gallop or edema.  Neurologic: Awake, alert, and oriented X3, CN II-XII intact.  Psychiatric: Cooperative, appropriate mood and affect.    Assessment/Plan   It was good seeing you!  You appear to have a urinary tract infection based on your urine results.  I have sent in the antibiotic Macrobid to your pharmacy.  There is not enough urine in the sample to send for a culture unfortunately  Increase water intake.  Call if symptoms worsen or do not improve.    F/u  With Perri  Diagnoses and all orders for this visit:  Dysuria  -     POCT UA Automated manually resulted  Acute cystitis with hematuria  -     nitrofurantoin, macrocrystal-monohydrate, (Macrobid) 100 mg capsule; Take 1 capsule (100 mg) by mouth 2 times a day for 7 days.

## 2024-09-12 ENCOUNTER — OFFICE VISIT (OUTPATIENT)
Dept: PRIMARY CARE | Facility: CLINIC | Age: 49
End: 2024-09-12
Payer: COMMERCIAL

## 2024-09-12 VITALS
SYSTOLIC BLOOD PRESSURE: 104 MMHG | WEIGHT: 108 LBS | OXYGEN SATURATION: 98 % | DIASTOLIC BLOOD PRESSURE: 88 MMHG | HEART RATE: 96 BPM | BODY MASS INDEX: 20.41 KG/M2

## 2024-09-12 DIAGNOSIS — N30.01 ACUTE CYSTITIS WITH HEMATURIA: ICD-10-CM

## 2024-09-12 DIAGNOSIS — R30.0 DYSURIA: Primary | ICD-10-CM

## 2024-09-12 LAB
POC APPEARANCE, URINE: ABNORMAL
POC BILIRUBIN, URINE: ABNORMAL
POC BLOOD, URINE: ABNORMAL
POC COLOR, URINE: YELLOW
POC GLUCOSE, URINE: NEGATIVE MG/DL
POC KETONES, URINE: ABNORMAL MG/DL
POC LEUKOCYTES, URINE: ABNORMAL
POC NITRITE,URINE: NEGATIVE
POC PH, URINE: 5.5 PH
POC PROTEIN, URINE: ABNORMAL MG/DL
POC SPECIFIC GRAVITY, URINE: >=1.03
POC UROBILINOGEN, URINE: 1 EU/DL

## 2024-09-12 PROCEDURE — 99213 OFFICE O/P EST LOW 20 MIN: CPT | Performed by: PHYSICIAN ASSISTANT

## 2024-09-12 PROCEDURE — 3074F SYST BP LT 130 MM HG: CPT | Performed by: PHYSICIAN ASSISTANT

## 2024-09-12 PROCEDURE — 4004F PT TOBACCO SCREEN RCVD TLK: CPT | Performed by: PHYSICIAN ASSISTANT

## 2024-09-12 PROCEDURE — 3079F DIAST BP 80-89 MM HG: CPT | Performed by: PHYSICIAN ASSISTANT

## 2024-09-12 PROCEDURE — 81003 URINALYSIS AUTO W/O SCOPE: CPT | Performed by: PHYSICIAN ASSISTANT

## 2024-09-12 RX ORDER — NITROFURANTOIN 25; 75 MG/1; MG/1
100 CAPSULE ORAL 2 TIMES DAILY
Qty: 14 CAPSULE | Refills: 0 | Status: SHIPPED | OUTPATIENT
Start: 2024-09-12 | End: 2024-09-19

## 2024-09-12 ASSESSMENT — LIFESTYLE VARIABLES
HOW OFTEN DO YOU HAVE SIX OR MORE DRINKS ON ONE OCCASION: NEVER
AUDIT-C TOTAL SCORE: 1
HOW MANY STANDARD DRINKS CONTAINING ALCOHOL DO YOU HAVE ON A TYPICAL DAY: 1 OR 2
SKIP TO QUESTIONS 9-10: 1
HOW OFTEN DO YOU HAVE A DRINK CONTAINING ALCOHOL: MONTHLY OR LESS

## 2024-09-12 ASSESSMENT — ENCOUNTER SYMPTOMS
LOSS OF SENSATION IN FEET: 0
DEPRESSION: 0
OCCASIONAL FEELINGS OF UNSTEADINESS: 0

## 2024-09-12 ASSESSMENT — PAIN SCALES - GENERAL: PAINLEVEL: 10-WORST PAIN EVER

## 2024-09-17 ENCOUNTER — TELEPHONE (OUTPATIENT)
Dept: PRIMARY CARE | Facility: CLINIC | Age: 49
End: 2024-09-17
Payer: COMMERCIAL

## 2024-09-17 NOTE — TELEPHONE ENCOUNTER
Patient was treated for a UTI by [facility] on 09/12/2024. Was given Macrobid. Patient was feeling better, but today she is having burning and pressure. Tomorrow is her last day of the ATB. Please advise [phone].

## 2024-09-20 DIAGNOSIS — I10 BENIGN HYPERTENSION: ICD-10-CM

## 2024-09-23 RX ORDER — AMLODIPINE BESYLATE 10 MG/1
10 TABLET ORAL DAILY
Qty: 30 TABLET | Refills: 5 | Status: SHIPPED | OUTPATIENT
Start: 2024-09-23

## 2024-11-12 DIAGNOSIS — E55.9 VITAMIN D DEFICIENCY: ICD-10-CM

## 2024-11-13 RX ORDER — FOLIC ACID/MULTIVIT,IRON,MINER 0.4MG-18MG
800 TABLET ORAL DAILY
Qty: 60 TABLET | Refills: 10 | OUTPATIENT
Start: 2024-11-13

## 2024-11-17 DIAGNOSIS — I10 BENIGN HYPERTENSION: ICD-10-CM

## 2024-11-18 RX ORDER — HYDROCHLOROTHIAZIDE 12.5 MG/1
12.5 TABLET ORAL DAILY
Qty: 30 TABLET | Refills: 10 | Status: SHIPPED | OUTPATIENT
Start: 2024-11-18

## 2025-01-14 ENCOUNTER — APPOINTMENT (OUTPATIENT)
Dept: PRIMARY CARE | Facility: CLINIC | Age: 50
End: 2025-01-14
Payer: COMMERCIAL

## 2025-01-15 DIAGNOSIS — E55.9 VITAMIN D DEFICIENCY: Primary | ICD-10-CM

## 2025-01-15 RX ORDER — MULTIVITAMIN
2 TABLET ORAL DAILY
Qty: 180 TABLET | Refills: 0 | Status: SHIPPED | OUTPATIENT
Start: 2025-01-15 | End: 2025-04-15

## 2025-01-15 NOTE — TELEPHONE ENCOUNTER
See Rx tab. Correct pharmacy selected. LOV 06/06/24 with you, saw [facility] for UTI 09/12/24, next OV 04/24/25.

## 2025-01-30 ENCOUNTER — TELEPHONE (OUTPATIENT)
Dept: PRIMARY CARE | Facility: CLINIC | Age: 50
End: 2025-01-30
Payer: COMMERCIAL

## 2025-01-30 DIAGNOSIS — E55.9 VITAMIN D DEFICIENCY: ICD-10-CM

## 2025-01-30 NOTE — TELEPHONE ENCOUNTER
"Patient called and said that you sent in the wrong vitamin. I let her know that the vitamin that was sent in is the one that she has been on for more than a year, I even double checked ECW. She said it isn't and \"I'm getting pissed now\" she then began to scream and yell at me and I ended the call. She called back and said that she wants chewable. I verified that the vitamin is correct that she just wants it in chewable form. She said yes the vitam in is correct and demands that you send it in chewable form. I did not edit her medication list.  "

## 2025-03-10 PROCEDURE — 88175 CYTOPATH C/V AUTO FLUID REDO: CPT

## 2025-03-11 ENCOUNTER — LAB REQUISITION (OUTPATIENT)
Dept: LAB | Facility: HOSPITAL | Age: 50
End: 2025-03-11
Payer: COMMERCIAL

## 2025-03-11 DIAGNOSIS — R87.810 CERVICAL HIGH RISK HUMAN PAPILLOMAVIRUS (HPV) DNA TEST POSITIVE: ICD-10-CM

## 2025-03-11 DIAGNOSIS — Z11.51 ENCOUNTER FOR SCREENING FOR HUMAN PAPILLOMAVIRUS (HPV): ICD-10-CM

## 2025-03-11 DIAGNOSIS — R87.612 LOW GRADE SQUAMOUS INTRAEPITHELIAL LESION ON CYTOLOGIC SMEAR OF CERVIX (LGSIL): ICD-10-CM

## 2025-03-11 DIAGNOSIS — N87.0 MILD CERVICAL DYSPLASIA: ICD-10-CM

## 2025-03-14 DIAGNOSIS — I10 BENIGN HYPERTENSION: ICD-10-CM

## 2025-03-17 RX ORDER — AMLODIPINE BESYLATE 10 MG/1
10 TABLET ORAL DAILY
Qty: 30 TABLET | Refills: 10 | Status: SHIPPED | OUTPATIENT
Start: 2025-03-17

## 2025-03-25 LAB
CYTOLOGY CMNT CVX/VAG CYTO-IMP: NORMAL
LAB AP HPV GENOTYPE QUESTION: YES
LAB AP HPV HR: NORMAL
LAB AP PREVIOUS ABNORMAL HISTORY: NORMAL
LABORATORY COMMENT REPORT: NORMAL
PATH REPORT.TOTAL CANCER: NORMAL

## 2025-04-24 ENCOUNTER — APPOINTMENT (OUTPATIENT)
Dept: PRIMARY CARE | Facility: CLINIC | Age: 50
End: 2025-04-24
Payer: COMMERCIAL

## 2025-05-30 ENCOUNTER — APPOINTMENT (OUTPATIENT)
Dept: PRIMARY CARE | Facility: CLINIC | Age: 50
End: 2025-05-30
Payer: COMMERCIAL

## 2025-08-07 ENCOUNTER — APPOINTMENT (OUTPATIENT)
Dept: PRIMARY CARE | Facility: CLINIC | Age: 50
End: 2025-08-07
Payer: COMMERCIAL

## 2025-08-12 ENCOUNTER — OFFICE VISIT (OUTPATIENT)
Dept: PRIMARY CARE | Facility: CLINIC | Age: 50
End: 2025-08-12
Payer: COMMERCIAL

## 2025-08-12 VITALS
BODY MASS INDEX: 20.54 KG/M2 | HEART RATE: 102 BPM | DIASTOLIC BLOOD PRESSURE: 84 MMHG | RESPIRATION RATE: 16 BRPM | OXYGEN SATURATION: 96 % | SYSTOLIC BLOOD PRESSURE: 119 MMHG | HEIGHT: 60 IN | WEIGHT: 104.6 LBS

## 2025-08-12 DIAGNOSIS — D22.9 NUMEROUS SKIN MOLES: ICD-10-CM

## 2025-08-12 DIAGNOSIS — Z12.31 ENCOUNTER FOR SCREENING MAMMOGRAM FOR MALIGNANT NEOPLASM OF BREAST: ICD-10-CM

## 2025-08-12 DIAGNOSIS — I10 BENIGN HYPERTENSION: ICD-10-CM

## 2025-08-12 DIAGNOSIS — K51.919 ULCERATIVE COLITIS WITH COMPLICATION, UNSPECIFIED LOCATION (MULTI): ICD-10-CM

## 2025-08-12 DIAGNOSIS — M25.571 ARTHRALGIA OF TOE OF RIGHT FOOT: ICD-10-CM

## 2025-08-12 DIAGNOSIS — S09.90XA INJURY OF HEAD, INITIAL ENCOUNTER: ICD-10-CM

## 2025-08-12 DIAGNOSIS — M62.81 MUSCLE WEAKNESS OF LOWER EXTREMITY: ICD-10-CM

## 2025-08-12 DIAGNOSIS — M81.0 AGE-RELATED OSTEOPOROSIS WITHOUT CURRENT PATHOLOGICAL FRACTURE: ICD-10-CM

## 2025-08-12 DIAGNOSIS — R73.01 ELEVATED FASTING GLUCOSE: ICD-10-CM

## 2025-08-12 DIAGNOSIS — Z00.00 MEDICARE ANNUAL WELLNESS VISIT, SUBSEQUENT: Primary | ICD-10-CM

## 2025-08-12 DIAGNOSIS — J41.0 SIMPLE CHRONIC BRONCHITIS (MULTI): ICD-10-CM

## 2025-08-12 DIAGNOSIS — Z13.220 SCREENING FOR CHOLESTEROL LEVEL: ICD-10-CM

## 2025-08-12 DIAGNOSIS — Z13.29 SCREENING FOR THYROID DISORDER: ICD-10-CM

## 2025-08-12 DIAGNOSIS — R29.6 FALLS FREQUENTLY: ICD-10-CM

## 2025-08-12 DIAGNOSIS — E55.9 VITAMIN D DEFICIENCY: ICD-10-CM

## 2025-08-12 DIAGNOSIS — Z13.21 ENCOUNTER FOR VITAMIN DEFICIENCY SCREENING: ICD-10-CM

## 2025-08-12 PROBLEM — J44.9 COPD (CHRONIC OBSTRUCTIVE PULMONARY DISEASE) (MULTI): Status: RESOLVED | Noted: 2023-08-28 | Resolved: 2025-08-12

## 2025-08-12 PROBLEM — K51.90 ULCERATIVE COLITIS: Status: RESOLVED | Noted: 2024-04-18 | Resolved: 2025-08-12

## 2025-08-12 PROCEDURE — 3008F BODY MASS INDEX DOCD: CPT

## 2025-08-12 PROCEDURE — G0439 PPPS, SUBSEQ VISIT: HCPCS

## 2025-08-12 PROCEDURE — 99215 OFFICE O/P EST HI 40 MIN: CPT | Mod: 25

## 2025-08-12 PROCEDURE — 3079F DIAST BP 80-89 MM HG: CPT

## 2025-08-12 PROCEDURE — 3074F SYST BP LT 130 MM HG: CPT

## 2025-08-12 ASSESSMENT — PAIN SCALES - GENERAL: PAINLEVEL_OUTOF10: 0-NO PAIN

## 2025-08-12 ASSESSMENT — ENCOUNTER SYMPTOMS
DEPRESSION: 0
OCCASIONAL FEELINGS OF UNSTEADINESS: 0
LOSS OF SENSATION IN FEET: 0

## 2025-08-12 ASSESSMENT — PATIENT HEALTH QUESTIONNAIRE - PHQ9
1. LITTLE INTEREST OR PLEASURE IN DOING THINGS: NOT AT ALL
3. TROUBLE FALLING OR STAYING ASLEEP OR SLEEPING TOO MUCH: MORE THAN HALF THE DAYS
SUM OF ALL RESPONSES TO PHQ QUESTIONS 1-9: 14
6. FEELING BAD ABOUT YOURSELF - OR THAT YOU ARE A FAILURE OR HAVE LET YOURSELF OR YOUR FAMILY DOWN: MORE THAN HALF THE DAYS
8. MOVING OR SPEAKING SO SLOWLY THAT OTHER PEOPLE COULD HAVE NOTICED. OR THE OPPOSITE, BEING SO FIGETY OR RESTLESS THAT YOU HAVE BEEN MOVING AROUND A LOT MORE THAN USUAL: NOT AT ALL
2. FEELING DOWN, DEPRESSED OR HOPELESS: NOT AT ALL
5. POOR APPETITE OR OVEREATING: MORE THAN HALF THE DAYS
7. TROUBLE CONCENTRATING ON THINGS, SUCH AS READING THE NEWSPAPER OR WATCHING TELEVISION: MORE THAN HALF THE DAYS
SUM OF ALL RESPONSES TO PHQ9 QUESTIONS 1 AND 2: 4
2. FEELING DOWN, DEPRESSED OR HOPELESS: MORE THAN HALF THE DAYS
4. FEELING TIRED OR HAVING LITTLE ENERGY: MORE THAN HALF THE DAYS
9. THOUGHTS THAT YOU WOULD BE BETTER OFF DEAD, OR OF HURTING YOURSELF: NOT AT ALL
1. LITTLE INTEREST OR PLEASURE IN DOING THINGS: MORE THAN HALF THE DAYS
SUM OF ALL RESPONSES TO PHQ9 QUESTIONS 1 AND 2: 0

## 2025-08-12 ASSESSMENT — COLUMBIA-SUICIDE SEVERITY RATING SCALE - C-SSRS
6. HAVE YOU EVER DONE ANYTHING, STARTED TO DO ANYTHING, OR PREPARED TO DO ANYTHING TO END YOUR LIFE?: NO
1. IN THE PAST MONTH, HAVE YOU WISHED YOU WERE DEAD OR WISHED YOU COULD GO TO SLEEP AND NOT WAKE UP?: NO
2. HAVE YOU ACTUALLY HAD ANY THOUGHTS OF KILLING YOURSELF?: NO

## 2025-08-18 ENCOUNTER — TELEPHONE (OUTPATIENT)
Dept: PRIMARY CARE | Facility: CLINIC | Age: 50
End: 2025-08-18
Payer: COMMERCIAL

## 2025-08-18 DIAGNOSIS — R29.6 MULTIPLE FALLS: Primary | ICD-10-CM

## 2025-08-18 DIAGNOSIS — S09.90XA INJURY OF HEAD, INITIAL ENCOUNTER: ICD-10-CM

## 2025-08-28 ENCOUNTER — HOSPITAL ENCOUNTER (OUTPATIENT)
Dept: RADIOLOGY | Facility: HOSPITAL | Age: 50
Discharge: HOME | End: 2025-08-28
Payer: COMMERCIAL

## 2025-08-28 DIAGNOSIS — S09.90XA INJURY OF HEAD, INITIAL ENCOUNTER: ICD-10-CM

## 2025-08-28 PROCEDURE — 70450 CT HEAD/BRAIN W/O DYE: CPT

## 2025-08-28 PROCEDURE — 70450 CT HEAD/BRAIN W/O DYE: CPT | Performed by: RADIOLOGY

## 2025-08-29 LAB
25(OH)D3+25(OH)D2 SERPL-MCNC: 81 NG/ML (ref 30–100)
ALBUMIN SERPL-MCNC: 4.8 G/DL (ref 3.6–5.1)
ALP SERPL-CCNC: 78 U/L (ref 31–125)
ALT SERPL-CCNC: 19 U/L (ref 6–29)
ANION GAP SERPL CALCULATED.4IONS-SCNC: 8 MMOL/L (CALC) (ref 7–17)
AST SERPL-CCNC: 30 U/L (ref 10–35)
BASOPHILS # BLD AUTO: 37 CELLS/UL (ref 0–200)
BASOPHILS NFR BLD AUTO: 0.3 %
BILIRUB SERPL-MCNC: 0.6 MG/DL (ref 0.2–1.2)
BUN SERPL-MCNC: 14 MG/DL (ref 7–25)
CALCIUM SERPL-MCNC: 9.7 MG/DL (ref 8.6–10.2)
CHLORIDE SERPL-SCNC: 101 MMOL/L (ref 98–110)
CHOLEST SERPL-MCNC: 199 MG/DL
CHOLEST/HDLC SERPL: 1.9 (CALC)
CO2 SERPL-SCNC: 27 MMOL/L (ref 20–32)
CREAT SERPL-MCNC: 0.64 MG/DL (ref 0.5–0.99)
EGFRCR SERPLBLD CKD-EPI 2021: 109 ML/MIN/1.73M2
EOSINOPHIL # BLD AUTO: 124 CELLS/UL (ref 15–500)
EOSINOPHIL NFR BLD AUTO: 1 %
ERYTHROCYTE [DISTWIDTH] IN BLOOD BY AUTOMATED COUNT: 13.1 % (ref 11–15)
EST. AVERAGE GLUCOSE BLD GHB EST-MCNC: 103 MG/DL
EST. AVERAGE GLUCOSE BLD GHB EST-SCNC: 5.7 MMOL/L
GLUCOSE SERPL-MCNC: 106 MG/DL (ref 65–99)
HBA1C MFR BLD: 5.2 %
HCT VFR BLD AUTO: 43.4 % (ref 35–45)
HDLC SERPL-MCNC: 104 MG/DL
HGB BLD-MCNC: 14.9 G/DL (ref 11.7–15.5)
LDLC SERPL CALC-MCNC: 81 MG/DL (CALC)
LYMPHOCYTES # BLD AUTO: 1860 CELLS/UL (ref 850–3900)
LYMPHOCYTES NFR BLD AUTO: 15 %
MCH RBC QN AUTO: 36 PG (ref 27–33)
MCHC RBC AUTO-ENTMCNC: 34.3 G/DL (ref 32–36)
MCV RBC AUTO: 104.8 FL (ref 80–100)
MONOCYTES # BLD AUTO: 880 CELLS/UL (ref 200–950)
MONOCYTES NFR BLD AUTO: 7.1 %
NEUTROPHILS # BLD AUTO: 9498 CELLS/UL (ref 1500–7800)
NEUTROPHILS NFR BLD AUTO: 76.6 %
NONHDLC SERPL-MCNC: 95 MG/DL (CALC)
PLATELET # BLD AUTO: 334 THOUSAND/UL (ref 140–400)
PMV BLD REES-ECKER: 10.2 FL (ref 7.5–12.5)
POTASSIUM SERPL-SCNC: 4.5 MMOL/L (ref 3.5–5.3)
PROT SERPL-MCNC: 7 G/DL (ref 6.1–8.1)
RBC # BLD AUTO: 4.14 MILLION/UL (ref 3.8–5.1)
SODIUM SERPL-SCNC: 136 MMOL/L (ref 135–146)
TRIGL SERPL-MCNC: 67 MG/DL
TSH SERPL-ACNC: 0.63 MIU/L
URATE SERPL-MCNC: 4 MG/DL (ref 2.5–7)
VIT B12 SERPL-MCNC: 412 PG/ML (ref 200–1100)
WBC # BLD AUTO: 12.4 THOUSAND/UL (ref 3.8–10.8)

## 2025-09-02 ENCOUNTER — RESULTS FOLLOW-UP (OUTPATIENT)
Dept: PRIMARY CARE | Facility: CLINIC | Age: 50
End: 2025-09-02
Payer: COMMERCIAL

## 2025-09-03 ENCOUNTER — HOSPITAL ENCOUNTER (OUTPATIENT)
Dept: RADIOLOGY | Facility: HOSPITAL | Age: 50
End: 2025-09-03
Payer: COMMERCIAL

## 2025-09-03 ENCOUNTER — APPOINTMENT (OUTPATIENT)
Dept: RADIOLOGY | Facility: HOSPITAL | Age: 50
End: 2025-09-03
Payer: COMMERCIAL